# Patient Record
Sex: MALE | Race: WHITE | Employment: FULL TIME | URBAN - NONMETROPOLITAN AREA
[De-identification: names, ages, dates, MRNs, and addresses within clinical notes are randomized per-mention and may not be internally consistent; named-entity substitution may affect disease eponyms.]

---

## 2017-01-06 ENCOUNTER — OFFICE VISIT (OUTPATIENT)
Dept: PRIMARY CARE CLINIC | Age: 27
End: 2017-01-06

## 2017-01-06 VITALS
DIASTOLIC BLOOD PRESSURE: 64 MMHG | BODY MASS INDEX: 29.39 KG/M2 | HEART RATE: 112 BPM | TEMPERATURE: 102.1 F | WEIGHT: 217 LBS | HEIGHT: 72 IN | SYSTOLIC BLOOD PRESSURE: 100 MMHG | OXYGEN SATURATION: 100 %

## 2017-01-06 DIAGNOSIS — R50.9 FEVER, UNSPECIFIED FEVER CAUSE: Primary | ICD-10-CM

## 2017-01-06 DIAGNOSIS — J10.1 INFLUENZA A: ICD-10-CM

## 2017-01-06 LAB
INFLUENZA A ANTIBODY: POSITIVE
INFLUENZA B ANTIBODY: NEGATIVE

## 2017-01-06 PROCEDURE — 99213 OFFICE O/P EST LOW 20 MIN: CPT | Performed by: FAMILY MEDICINE

## 2017-01-06 PROCEDURE — 87804 INFLUENZA ASSAY W/OPTIC: CPT | Performed by: FAMILY MEDICINE

## 2017-01-06 RX ORDER — OSELTAMIVIR PHOSPHATE 75 MG/1
75 CAPSULE ORAL 2 TIMES DAILY
Qty: 10 CAPSULE | Refills: 0 | Status: SHIPPED | OUTPATIENT
Start: 2017-01-06 | End: 2017-01-11

## 2017-01-06 ASSESSMENT — ENCOUNTER SYMPTOMS
GASTROINTESTINAL NEGATIVE: 1
COUGH: 1
EYES NEGATIVE: 1
SORE THROAT: 1
ALLERGIC/IMMUNOLOGIC NEGATIVE: 1
RHINORRHEA: 1

## 2018-01-14 ENCOUNTER — OFFICE VISIT (OUTPATIENT)
Dept: PRIMARY CARE CLINIC | Age: 28
End: 2018-01-14

## 2018-01-14 VITALS
WEIGHT: 223 LBS | HEART RATE: 95 BPM | DIASTOLIC BLOOD PRESSURE: 82 MMHG | TEMPERATURE: 98.3 F | BODY MASS INDEX: 30.2 KG/M2 | HEIGHT: 72 IN | SYSTOLIC BLOOD PRESSURE: 124 MMHG

## 2018-01-14 DIAGNOSIS — J02.9 PHARYNGITIS, UNSPECIFIED ETIOLOGY: ICD-10-CM

## 2018-01-14 DIAGNOSIS — J02.9 SORE THROAT: ICD-10-CM

## 2018-01-14 DIAGNOSIS — H66.001 ACUTE SUPPURATIVE OTITIS MEDIA OF RIGHT EAR WITHOUT SPONTANEOUS RUPTURE OF TYMPANIC MEMBRANE, RECURRENCE NOT SPECIFIED: Primary | ICD-10-CM

## 2018-01-14 LAB — S PYO AG THROAT QL: NORMAL

## 2018-01-14 PROCEDURE — 87880 STREP A ASSAY W/OPTIC: CPT | Performed by: FAMILY MEDICINE

## 2018-01-14 PROCEDURE — 99214 OFFICE O/P EST MOD 30 MIN: CPT | Performed by: FAMILY MEDICINE

## 2018-01-14 RX ORDER — AZITHROMYCIN 250 MG/1
TABLET, FILM COATED ORAL
Qty: 1 PACKET | Refills: 1 | Status: SHIPPED | OUTPATIENT
Start: 2018-01-14 | End: 2019-01-26

## 2018-01-14 ASSESSMENT — ENCOUNTER SYMPTOMS
ABDOMINAL PAIN: 0
EYE REDNESS: 0
CONSTIPATION: 0
EYE DISCHARGE: 0
SINUS PAIN: 0
SORE THROAT: 1
NAUSEA: 0
WHEEZING: 0
SINUS PRESSURE: 0
VOMITING: 0
COUGH: 0
RHINORRHEA: 0
SHORTNESS OF BREATH: 0
TROUBLE SWALLOWING: 0
DIARRHEA: 0
SWOLLEN GLANDS: 1

## 2019-01-26 ENCOUNTER — OFFICE VISIT (OUTPATIENT)
Dept: PRIMARY CARE CLINIC | Age: 29
End: 2019-01-26
Payer: COMMERCIAL

## 2019-01-26 VITALS
TEMPERATURE: 98.7 F | OXYGEN SATURATION: 97 % | DIASTOLIC BLOOD PRESSURE: 84 MMHG | BODY MASS INDEX: 30.2 KG/M2 | SYSTOLIC BLOOD PRESSURE: 126 MMHG | WEIGHT: 223 LBS | HEIGHT: 72 IN | HEART RATE: 88 BPM

## 2019-01-26 DIAGNOSIS — J20.9 ACUTE BRONCHITIS, UNSPECIFIED ORGANISM: Primary | ICD-10-CM

## 2019-01-26 PROCEDURE — 99214 OFFICE O/P EST MOD 30 MIN: CPT | Performed by: FAMILY MEDICINE

## 2019-01-26 RX ORDER — PREDNISONE 20 MG/1
TABLET ORAL
Qty: 10 TABLET | Refills: 0 | Status: SHIPPED | OUTPATIENT
Start: 2019-01-26

## 2019-01-26 RX ORDER — BENZONATATE 100 MG/1
100 CAPSULE ORAL 3 TIMES DAILY PRN
Qty: 30 CAPSULE | Refills: 1 | Status: SHIPPED | OUTPATIENT
Start: 2019-01-26

## 2019-01-26 ASSESSMENT — ENCOUNTER SYMPTOMS
SORE THROAT: 1
SINUS PAIN: 1
SHORTNESS OF BREATH: 0
VOMITING: 0
NAUSEA: 0
TROUBLE SWALLOWING: 0
RHINORRHEA: 1
COUGH: 1
ABDOMINAL PAIN: 0
DIARRHEA: 0
CONSTIPATION: 0
SINUS PRESSURE: 0
EYE REDNESS: 0
EYE DISCHARGE: 0
WHEEZING: 0

## 2024-08-08 ENCOUNTER — HOSPITAL ENCOUNTER (OUTPATIENT)
Age: 34
Setting detail: SPECIMEN
Discharge: HOME | End: 2024-08-08
Payer: COMMERCIAL

## 2024-08-08 ENCOUNTER — OFFICE VISIT (OUTPATIENT)
Dept: PRIMARY CARE CLINIC | Age: 34
End: 2024-08-08
Payer: COMMERCIAL

## 2024-08-08 VITALS
OXYGEN SATURATION: 98 % | SYSTOLIC BLOOD PRESSURE: 100 MMHG | HEIGHT: 72 IN | RESPIRATION RATE: 22 BRPM | WEIGHT: 224 LBS | TEMPERATURE: 103.7 F | HEART RATE: 120 BPM | DIASTOLIC BLOOD PRESSURE: 70 MMHG | BODY MASS INDEX: 30.34 KG/M2

## 2024-08-08 DIAGNOSIS — R50.9 FEVER, UNSPECIFIED FEVER CAUSE: Primary | ICD-10-CM

## 2024-08-08 DIAGNOSIS — R50.9 FEVER, UNSPECIFIED FEVER CAUSE: ICD-10-CM

## 2024-08-08 DIAGNOSIS — Z78.9 RECENT FOREIGN TRAVEL: ICD-10-CM

## 2024-08-08 LAB
ALBUMIN SERPL-MCNC: 4.4 G/DL (ref 3.5–5.2)
ALBUMIN/GLOB SERPL: 1.5 {RATIO} (ref 1–2.5)
ALP SERPL-CCNC: 96 U/L (ref 40–129)
ALT SERPL-CCNC: 157 U/L (ref 5–41)
ANION GAP SERPL CALCULATED.3IONS-SCNC: 14 MMOL/L (ref 9–17)
AST SERPL-CCNC: 101 U/L
BASOPHILS # BLD: 0.04 K/UL (ref 0–0.2)
BASOPHILS NFR BLD: 1 % (ref 0–2)
BILIRUB SERPL-MCNC: 0.7 MG/DL (ref 0.3–1.2)
BILIRUB UR QL STRIP: NEGATIVE
BUN SERPL-MCNC: 15 MG/DL (ref 6–20)
BUN/CREAT SERPL: 12 (ref 9–20)
CALCIUM SERPL-MCNC: 8.9 MG/DL (ref 8.6–10.4)
CLARITY UR: CLEAR
CO2 SERPL-SCNC: 22 MMOL/L (ref 20–31)
COLOR UR: YELLOW
COMMENT: NORMAL
CREAT SERPL-MCNC: 1.3 MG/DL (ref 0.7–1.2)
EOSINOPHIL # BLD: <0.03 K/UL (ref 0–0.44)
EOSINOPHILS RELATIVE PERCENT: 0 % (ref 1–4)
ERYTHROCYTE [DISTWIDTH] IN BLOOD BY AUTOMATED COUNT: 12.5 % (ref 11.8–14.4)
GFR, ESTIMATED: 73 ML/MIN/1.73M2
GLUCOSE SERPL-MCNC: 107 MG/DL (ref 70–99)
GLUCOSE UR STRIP-MCNC: NEGATIVE MG/DL
HCT VFR BLD AUTO: 44.2 % (ref 40.7–50.3)
HGB BLD-MCNC: 15.6 G/DL (ref 13–17)
HGB UR QL STRIP.AUTO: NEGATIVE
IMM GRANULOCYTES # BLD AUTO: <0.03 K/UL (ref 0–0.3)
IMM GRANULOCYTES NFR BLD: 0 %
KETONES UR STRIP-MCNC: NEGATIVE MG/DL
LEUKOCYTE ESTERASE UR QL STRIP: NEGATIVE
LYMPHOCYTES NFR BLD: 0.83 K/UL (ref 1.1–3.7)
LYMPHOCYTES RELATIVE PERCENT: 18 % (ref 24–43)
Lab: NORMAL
MCH RBC QN AUTO: 29.6 PG (ref 25.2–33.5)
MCHC RBC AUTO-ENTMCNC: 35.3 G/DL (ref 25.2–33.5)
MCV RBC AUTO: 83.9 FL (ref 82.6–102.9)
MONOCYTES NFR BLD: 0.47 K/UL (ref 0.1–1.2)
MONOCYTES NFR BLD: 10 % (ref 3–12)
NEUTROPHILS NFR BLD: 71 % (ref 36–65)
NEUTS SEG NFR BLD: 3.39 K/UL (ref 1.5–8.1)
NITRITE UR QL STRIP: NEGATIVE
NRBC BLD-RTO: 0 PER 100 WBC
PH UR STRIP: 6 [PH] (ref 5–6)
PLATELET # BLD AUTO: 158 K/UL (ref 138–453)
PMV BLD AUTO: 9.1 FL (ref 8.1–13.5)
POTASSIUM SERPL-SCNC: 3.9 MMOL/L (ref 3.7–5.3)
PROT SERPL-MCNC: 7.4 G/DL (ref 6.4–8.3)
PROT UR STRIP-MCNC: NEGATIVE MG/DL
QC PASS/FAIL: NORMAL
RBC # BLD AUTO: 5.27 M/UL (ref 4.21–5.77)
SARS-COV-2 RDRP RESP QL NAA+PROBE: NEGATIVE
SODIUM SERPL-SCNC: 136 MMOL/L (ref 135–144)
SP GR UR STRIP: 1.02 (ref 1.01–1.02)
UROBILINOGEN UR STRIP-ACNC: NORMAL EU/DL (ref 0–1)
WBC OTHER # BLD: 4.7 K/UL (ref 3.5–11.3)

## 2024-08-08 PROCEDURE — 85025 COMPLETE CBC W/AUTO DIFF WBC: CPT

## 2024-08-08 PROCEDURE — 87635 SARS-COV-2 COVID-19 AMP PRB: CPT | Performed by: FAMILY MEDICINE

## 2024-08-08 PROCEDURE — 1036F TOBACCO NON-USER: CPT | Performed by: FAMILY MEDICINE

## 2024-08-08 PROCEDURE — 87207 SMEAR SPECIAL STAIN: CPT

## 2024-08-08 PROCEDURE — 80053 COMPREHEN METABOLIC PANEL: CPT

## 2024-08-08 PROCEDURE — 81003 URINALYSIS AUTO W/O SCOPE: CPT

## 2024-08-08 PROCEDURE — 99204 OFFICE O/P NEW MOD 45 MIN: CPT | Performed by: FAMILY MEDICINE

## 2024-08-08 PROCEDURE — G8417 CALC BMI ABV UP PARAM F/U: HCPCS | Performed by: FAMILY MEDICINE

## 2024-08-08 PROCEDURE — G8427 DOCREV CUR MEDS BY ELIG CLIN: HCPCS | Performed by: FAMILY MEDICINE

## 2024-08-08 PROCEDURE — 36415 COLL VENOUS BLD VENIPUNCTURE: CPT

## 2024-08-08 PROCEDURE — 86790 VIRUS ANTIBODY NOS: CPT

## 2024-08-08 RX ORDER — BUDESONIDE AND FORMOTEROL FUMARATE DIHYDRATE 160; 4.5 UG/1; UG/1
2 AEROSOL RESPIRATORY (INHALATION) DAILY
Status: ON HOLD | COMMUNITY
Start: 2024-04-25

## 2024-08-08 RX ORDER — MONTELUKAST SODIUM 10 MG/1
10 TABLET ORAL NIGHTLY
Status: ON HOLD | COMMUNITY
Start: 2024-04-25

## 2024-08-08 SDOH — ECONOMIC STABILITY: INCOME INSECURITY: HOW HARD IS IT FOR YOU TO PAY FOR THE VERY BASICS LIKE FOOD, HOUSING, MEDICAL CARE, AND HEATING?: NOT HARD AT ALL

## 2024-08-08 SDOH — ECONOMIC STABILITY: FOOD INSECURITY: WITHIN THE PAST 12 MONTHS, YOU WORRIED THAT YOUR FOOD WOULD RUN OUT BEFORE YOU GOT MONEY TO BUY MORE.: NEVER TRUE

## 2024-08-08 SDOH — ECONOMIC STABILITY: HOUSING INSECURITY
IN THE LAST 12 MONTHS, WAS THERE A TIME WHEN YOU DID NOT HAVE A STEADY PLACE TO SLEEP OR SLEPT IN A SHELTER (INCLUDING NOW)?: NO

## 2024-08-08 SDOH — ECONOMIC STABILITY: FOOD INSECURITY: WITHIN THE PAST 12 MONTHS, THE FOOD YOU BOUGHT JUST DIDN'T LAST AND YOU DIDN'T HAVE MONEY TO GET MORE.: NEVER TRUE

## 2024-08-08 ASSESSMENT — ENCOUNTER SYMPTOMS
CHEST TIGHTNESS: 0
SORE THROAT: 0
DIARRHEA: 1
NAUSEA: 0
VOMITING: 0
SHORTNESS OF BREATH: 0
ABDOMINAL PAIN: 0
BLOOD IN STOOL: 0
COUGH: 1
WHEEZING: 0

## 2024-08-08 ASSESSMENT — PATIENT HEALTH QUESTIONNAIRE - PHQ9
SUM OF ALL RESPONSES TO PHQ QUESTIONS 1-9: 0
SUM OF ALL RESPONSES TO PHQ QUESTIONS 1-9: 0
SUM OF ALL RESPONSES TO PHQ9 QUESTIONS 1 & 2: 0
SUM OF ALL RESPONSES TO PHQ QUESTIONS 1-9: 0
SUM OF ALL RESPONSES TO PHQ QUESTIONS 1-9: 0
1. LITTLE INTEREST OR PLEASURE IN DOING THINGS: NOT AT ALL
2. FEELING DOWN, DEPRESSED OR HOPELESS: NOT AT ALL

## 2024-08-08 NOTE — PROGRESS NOTES
Total Protein 08/08/2024 7.4  6.4 - 8.3 g/dL Final    Albumin 08/08/2024 4.4  3.5 - 5.2 g/dL Final    Albumin/Globulin Ratio 08/08/2024 1.5  1.0 - 2.5 Final    Total Bilirubin 08/08/2024 0.7  0.3 - 1.2 mg/dL Final    Alkaline Phosphatase 08/08/2024 96  40 - 129 U/L Final    ALT 08/08/2024 157 (H)  5 - 41 U/L Final    AST 08/08/2024 101 (H)  <40 U/L Final   Office Visit on 08/08/2024   Component Date Value Ref Range Status    SARS-COV-2, RdRp gene 08/08/2024 Negative  Negative Final    Lot Number 08/08/2024 [id number]   Final    QC Pass/Fail 08/08/2024 pass   Final            Plan:    Assessment & Plan     Fever with recent travel to [place]: new; his covid testing was negative so I tested for malaria and dengue fever. His LFTs are elevated so I advised him to avoid alcohol and to stay well hydrated. He will be called with malaria and dengue testing when it is available.     Return if symptoms worsen or fail to improve.    Orders Placed This Encounter   Procedures    Malaria Smear     Standing Status:   Future     Number of Occurrences:   1     Standing Expiration Date:   8/8/2025    Dengue Fever Antibodies IgG & IgM     Standing Status:   Future     Number of Occurrences:   1     Standing Expiration Date:   8/8/2025    Comprehensive Metabolic Panel     Standing Status:   Future     Number of Occurrences:   1     Standing Expiration Date:   8/8/2025    CBC with Auto Differential     Standing Status:   Future     Number of Occurrences:   1     Standing Expiration Date:   8/8/2025    Urinalysis with Reflex to Culture     Standing Status:   Future     Number of Occurrences:   1     Standing Expiration Date:   8/8/2025     Order Specific Question:   SPECIFY(EX-CATH,MIDSTREAM,CYSTO,ETC)?     Answer:   midstream    POCT COVID-19 Rapid, NAAT     Order Specific Question:   Pregnant:     Answer:   No         Patientgiven educational materials - see patient instructions.  Discussed use, benefit,and side effects of prescribed

## 2024-08-10 ENCOUNTER — APPOINTMENT (OUTPATIENT)
Dept: CT IMAGING | Age: 34
End: 2024-08-10
Payer: COMMERCIAL

## 2024-08-10 ENCOUNTER — HOSPITAL ENCOUNTER (EMERGENCY)
Age: 34
Discharge: ANOTHER HOSPITAL | End: 2024-08-10
Attending: EMERGENCY MEDICINE
Payer: COMMERCIAL

## 2024-08-10 ENCOUNTER — HOSPITAL ENCOUNTER (INPATIENT)
Age: 34
LOS: 2 days | Discharge: HOME | DRG: 864 | End: 2024-08-12
Attending: STUDENT IN AN ORGANIZED HEALTH CARE EDUCATION/TRAINING PROGRAM | Admitting: STUDENT IN AN ORGANIZED HEALTH CARE EDUCATION/TRAINING PROGRAM
Payer: COMMERCIAL

## 2024-08-10 ENCOUNTER — TELEPHONE (OUTPATIENT)
Dept: PRIMARY CARE CLINIC | Age: 34
End: 2024-08-10

## 2024-08-10 VITALS
DIASTOLIC BLOOD PRESSURE: 71 MMHG | HEIGHT: 72 IN | HEART RATE: 92 BPM | OXYGEN SATURATION: 99 % | RESPIRATION RATE: 29 BRPM | TEMPERATURE: 100.2 F | SYSTOLIC BLOOD PRESSURE: 101 MMHG | WEIGHT: 223 LBS | BODY MASS INDEX: 30.2 KG/M2

## 2024-08-10 DIAGNOSIS — I21.4 NSTEMI (NON-ST ELEVATED MYOCARDIAL INFARCTION) (HCC): Primary | ICD-10-CM

## 2024-08-10 DIAGNOSIS — R50.9 FEVER OF UNKNOWN ORIGIN: ICD-10-CM

## 2024-08-10 DIAGNOSIS — R74.8 ELEVATED LIVER ENZYMES: ICD-10-CM

## 2024-08-10 DIAGNOSIS — L60.0 INGROWING NAIL: ICD-10-CM

## 2024-08-10 DIAGNOSIS — R55 SYNCOPE AND COLLAPSE: ICD-10-CM

## 2024-08-10 DIAGNOSIS — R79.89 ELEVATED TROPONIN: Primary | ICD-10-CM

## 2024-08-10 DIAGNOSIS — A68.1: ICD-10-CM

## 2024-08-10 LAB
ALBUMIN SERPL-MCNC: 4.2 G/DL (ref 3.5–5.2)
ALBUMIN/GLOB SERPL: 1.4 {RATIO} (ref 1–2.5)
ALP SERPL-CCNC: 93 U/L (ref 40–129)
ALT SERPL-CCNC: 124 U/L (ref 5–41)
ANION GAP SERPL CALCULATED.3IONS-SCNC: 12 MMOL/L (ref 9–17)
AST SERPL-CCNC: 62 U/L
B PARAP IS1001 DNA NPH QL NAA+NON-PROBE: NOT DETECTED
B PERT DNA SPEC QL NAA+PROBE: NOT DETECTED
BACTERIA URNS QL MICRO: NORMAL
BASOPHILS # BLD: 0.03 K/UL (ref 0–0.2)
BASOPHILS NFR BLD: 1 % (ref 0–2)
BILIRUB DIRECT SERPL-MCNC: 0.2 MG/DL
BILIRUB INDIRECT SERPL-MCNC: 0.6 MG/DL (ref 0–1)
BILIRUB SERPL-MCNC: 0.8 MG/DL (ref 0.3–1.2)
BILIRUB UR QL STRIP: NEGATIVE
BUN SERPL-MCNC: 15 MG/DL (ref 6–20)
C PNEUM DNA NPH QL NAA+NON-PROBE: NOT DETECTED
CALCIUM SERPL-MCNC: 9 MG/DL (ref 8.6–10.4)
CASTS #/AREA URNS LPF: NORMAL /LPF (ref 0–8)
CHLORIDE SERPL-SCNC: 100 MMOL/L (ref 98–107)
CHOLEST SERPL-MCNC: 136 MG/DL (ref 0–199)
CHOLESTEROL/HDL RATIO: 9
CLARITY UR: CLEAR
CO2 SERPL-SCNC: 25 MMOL/L (ref 20–31)
COLOR UR: YELLOW
CREAT SERPL-MCNC: 1.1 MG/DL (ref 0.7–1.2)
CRP SERPL HS-MCNC: 55.8 MG/L (ref 0–5)
D DIMER PPP FEU-MCNC: 1.74 UG/ML FEU (ref 0–0.59)
EOSINOPHIL # BLD: <0.03 K/UL (ref 0–0.44)
EOSINOPHILS RELATIVE PERCENT: 0 % (ref 1–4)
EPI CELLS #/AREA URNS HPF: NORMAL /HPF (ref 0–5)
ERYTHROCYTE [DISTWIDTH] IN BLOOD BY AUTOMATED COUNT: 12.7 % (ref 11.8–14.4)
FLUAV RNA NPH QL NAA+NON-PROBE: NOT DETECTED
FLUBV RNA NPH QL NAA+NON-PROBE: NOT DETECTED
GFR, ESTIMATED: 90 ML/MIN/1.73M2
GLUCOSE SERPL-MCNC: 118 MG/DL (ref 70–99)
GLUCOSE UR STRIP-MCNC: NEGATIVE MG/DL
HADV DNA NPH QL NAA+NON-PROBE: NOT DETECTED
HAV IGM SERPL QL IA: NONREACTIVE
HBV CORE IGM SERPL QL IA: NONREACTIVE
HBV SURFACE AG SERPL QL IA: NONREACTIVE
HCOV 229E RNA NPH QL NAA+NON-PROBE: NOT DETECTED
HCOV HKU1 RNA NPH QL NAA+NON-PROBE: NOT DETECTED
HCOV NL63 RNA NPH QL NAA+NON-PROBE: NOT DETECTED
HCOV OC43 RNA NPH QL NAA+NON-PROBE: NOT DETECTED
HCT VFR BLD AUTO: 44.1 % (ref 40.7–50.3)
HCV AB SERPL QL IA: NONREACTIVE
HDLC SERPL-MCNC: 15 MG/DL
HGB BLD-MCNC: 15.6 G/DL (ref 13–17)
HGB UR QL STRIP.AUTO: NEGATIVE
HMPV RNA NPH QL NAA+NON-PROBE: NOT DETECTED
HPIV1 RNA NPH QL NAA+NON-PROBE: NOT DETECTED
HPIV2 RNA NPH QL NAA+NON-PROBE: NOT DETECTED
HPIV3 RNA NPH QL NAA+NON-PROBE: NOT DETECTED
HPIV4 RNA NPH QL NAA+NON-PROBE: NOT DETECTED
IMM GRANULOCYTES # BLD AUTO: <0.03 K/UL (ref 0–0.3)
IMM GRANULOCYTES NFR BLD: 0 %
KETONES UR STRIP-MCNC: NEGATIVE MG/DL
LACTATE BLDV-SCNC: 1.7 MMOL/L (ref 0.5–1.9)
LDLC SERPL CALC-MCNC: ABNORMAL MG/DL (ref 0–100)
LDLC SERPL DIRECT ASSAY-MCNC: 65 MG/DL
LEUKOCYTE ESTERASE UR QL STRIP: NEGATIVE
LIPASE SERPL-CCNC: 48 U/L (ref 13–60)
LYMPHOCYTES NFR BLD: 1.01 K/UL (ref 1.1–3.7)
LYMPHOCYTES RELATIVE PERCENT: 16 % (ref 24–43)
M PNEUMO DNA NPH QL NAA+NON-PROBE: NOT DETECTED
MCH RBC QN AUTO: 29.8 PG (ref 25.2–33.5)
MCHC RBC AUTO-ENTMCNC: 35.4 G/DL (ref 25.2–33.5)
MCV RBC AUTO: 84.3 FL (ref 82.6–102.9)
MONOCYTES NFR BLD: 0.73 K/UL (ref 0.1–1.2)
MONOCYTES NFR BLD: 11 % (ref 3–12)
NEUTROPHILS NFR BLD: 72 % (ref 36–65)
NEUTS SEG NFR BLD: 4.73 K/UL (ref 1.5–8.1)
NITRITE UR QL STRIP: NEGATIVE
NRBC BLD-RTO: 0 PER 100 WBC
PH UR STRIP: 6.5 [PH] (ref 5–8)
PLATELET # BLD AUTO: 174 K/UL (ref 138–453)
PMV BLD AUTO: 9.8 FL (ref 8.1–13.5)
POTASSIUM SERPL-SCNC: 4.1 MMOL/L (ref 3.7–5.3)
PROCALCITONIN SERPL-MCNC: 0.39 NG/ML (ref 0–0.09)
PROT SERPL-MCNC: 7.3 G/DL (ref 6.4–8.3)
PROT UR STRIP-MCNC: NEGATIVE MG/DL
RBC # BLD AUTO: 5.23 M/UL (ref 4.21–5.77)
RBC #/AREA URNS HPF: NORMAL /HPF (ref 0–4)
RSV RNA NPH QL NAA+NON-PROBE: NOT DETECTED
RV+EV RNA NPH QL NAA+NON-PROBE: NOT DETECTED
SARS-COV-2 RNA NPH QL NAA+NON-PROBE: NOT DETECTED
SEND OUT REPORT: NORMAL
SODIUM SERPL-SCNC: 137 MMOL/L (ref 135–144)
SP GR UR STRIP: 1.03 (ref 1–1.03)
SPECIMEN DESCRIPTION: NORMAL
TEST NAME: NORMAL
TRIGL SERPL-MCNC: 683 MG/DL
TROPONIN I SERPL HS-MCNC: 102 NG/L (ref 0–22)
TROPONIN I SERPL HS-MCNC: 110 NG/L (ref 0–22)
UROBILINOGEN UR STRIP-ACNC: NORMAL EU/DL (ref 0–1)
VLDLC SERPL CALC-MCNC: ABNORMAL MG/DL
WBC #/AREA URNS HPF: NORMAL /HPF (ref 0–5)
WBC OTHER # BLD: 6.5 K/UL (ref 3.5–11.3)

## 2024-08-10 PROCEDURE — 87798 DETECT AGENT NOS DNA AMP: CPT

## 2024-08-10 PROCEDURE — 6360000002 HC RX W HCPCS: Performed by: INTERNAL MEDICINE

## 2024-08-10 PROCEDURE — 96375 TX/PRO/DX INJ NEW DRUG ADDON: CPT

## 2024-08-10 PROCEDURE — 2580000003 HC RX 258: Performed by: EMERGENCY MEDICINE

## 2024-08-10 PROCEDURE — 99285 EMERGENCY DEPT VISIT HI MDM: CPT

## 2024-08-10 PROCEDURE — 36415 COLL VENOUS BLD VENIPUNCTURE: CPT

## 2024-08-10 PROCEDURE — 2500000003 HC RX 250 WO HCPCS: Performed by: PHYSICIAN ASSISTANT

## 2024-08-10 PROCEDURE — G0378 HOSPITAL OBSERVATION PER HR: HCPCS

## 2024-08-10 PROCEDURE — 93005 ELECTROCARDIOGRAM TRACING: CPT | Performed by: EMERGENCY MEDICINE

## 2024-08-10 PROCEDURE — 80053 COMPREHEN METABOLIC PANEL: CPT

## 2024-08-10 PROCEDURE — 87154 CUL TYP ID BLD PTHGN 6+ TRGT: CPT

## 2024-08-10 PROCEDURE — 86140 C-REACTIVE PROTEIN: CPT

## 2024-08-10 PROCEDURE — 99222 1ST HOSP IP/OBS MODERATE 55: CPT | Performed by: PHYSICIAN ASSISTANT

## 2024-08-10 PROCEDURE — 87040 BLOOD CULTURE FOR BACTERIA: CPT

## 2024-08-10 PROCEDURE — 96366 THER/PROPH/DIAG IV INF ADDON: CPT

## 2024-08-10 PROCEDURE — G0379 DIRECT REFER HOSPITAL OBSERV: HCPCS

## 2024-08-10 PROCEDURE — 0202U NFCT DS 22 TRGT SARS-COV-2: CPT

## 2024-08-10 PROCEDURE — 82248 BILIRUBIN DIRECT: CPT

## 2024-08-10 PROCEDURE — 99254 IP/OBS CNSLTJ NEW/EST MOD 60: CPT | Performed by: INTERNAL MEDICINE

## 2024-08-10 PROCEDURE — 2580000003 HC RX 258: Performed by: PHYSICIAN ASSISTANT

## 2024-08-10 PROCEDURE — 6360000004 HC RX CONTRAST MEDICATION: Performed by: EMERGENCY MEDICINE

## 2024-08-10 PROCEDURE — 96372 THER/PROPH/DIAG INJ SC/IM: CPT

## 2024-08-10 PROCEDURE — 84484 ASSAY OF TROPONIN QUANT: CPT

## 2024-08-10 PROCEDURE — 83605 ASSAY OF LACTIC ACID: CPT

## 2024-08-10 PROCEDURE — 84145 PROCALCITONIN (PCT): CPT

## 2024-08-10 PROCEDURE — 1200000000 HC SEMI PRIVATE

## 2024-08-10 PROCEDURE — 96365 THER/PROPH/DIAG IV INF INIT: CPT

## 2024-08-10 PROCEDURE — 83690 ASSAY OF LIPASE: CPT

## 2024-08-10 PROCEDURE — 6360000002 HC RX W HCPCS: Performed by: EMERGENCY MEDICINE

## 2024-08-10 PROCEDURE — 81001 URINALYSIS AUTO W/SCOPE: CPT

## 2024-08-10 PROCEDURE — 2500000003 HC RX 250 WO HCPCS: Performed by: EMERGENCY MEDICINE

## 2024-08-10 PROCEDURE — 85025 COMPLETE CBC W/AUTO DIFF WBC: CPT

## 2024-08-10 PROCEDURE — 87205 SMEAR GRAM STAIN: CPT

## 2024-08-10 PROCEDURE — 85379 FIBRIN DEGRADATION QUANT: CPT

## 2024-08-10 PROCEDURE — 71260 CT THORAX DX C+: CPT

## 2024-08-10 PROCEDURE — 83721 ASSAY OF BLOOD LIPOPROTEIN: CPT

## 2024-08-10 PROCEDURE — 80061 LIPID PANEL: CPT

## 2024-08-10 PROCEDURE — 80074 ACUTE HEPATITIS PANEL: CPT

## 2024-08-10 RX ORDER — ENOXAPARIN SODIUM 100 MG/ML
40 INJECTION SUBCUTANEOUS DAILY
Status: DISCONTINUED | OUTPATIENT
Start: 2024-08-11 | End: 2024-08-12 | Stop reason: HOSPADM

## 2024-08-10 RX ORDER — MAGNESIUM SULFATE IN WATER 40 MG/ML
2000 INJECTION, SOLUTION INTRAVENOUS PRN
Status: DISCONTINUED | OUTPATIENT
Start: 2024-08-10 | End: 2024-08-12 | Stop reason: HOSPADM

## 2024-08-10 RX ORDER — POLYETHYLENE GLYCOL 3350 17 G/17G
17 POWDER, FOR SOLUTION ORAL DAILY PRN
Status: DISCONTINUED | OUTPATIENT
Start: 2024-08-10 | End: 2024-08-12 | Stop reason: HOSPADM

## 2024-08-10 RX ORDER — ENOXAPARIN SODIUM 100 MG/ML
1 INJECTION SUBCUTANEOUS ONCE
Status: COMPLETED | OUTPATIENT
Start: 2024-08-10 | End: 2024-08-10

## 2024-08-10 RX ORDER — 0.9 % SODIUM CHLORIDE 0.9 %
1000 INTRAVENOUS SOLUTION INTRAVENOUS ONCE
Status: COMPLETED | OUTPATIENT
Start: 2024-08-10 | End: 2024-08-10

## 2024-08-10 RX ORDER — SODIUM CHLORIDE 9 MG/ML
INJECTION, SOLUTION INTRAVENOUS CONTINUOUS
Status: DISCONTINUED | OUTPATIENT
Start: 2024-08-10 | End: 2024-08-12 | Stop reason: HOSPADM

## 2024-08-10 RX ORDER — POTASSIUM CHLORIDE 7.45 MG/ML
10 INJECTION INTRAVENOUS PRN
Status: DISCONTINUED | OUTPATIENT
Start: 2024-08-10 | End: 2024-08-12 | Stop reason: HOSPADM

## 2024-08-10 RX ORDER — ONDANSETRON 2 MG/ML
4 INJECTION INTRAMUSCULAR; INTRAVENOUS EVERY 6 HOURS PRN
Status: DISCONTINUED | OUTPATIENT
Start: 2024-08-10 | End: 2024-08-12 | Stop reason: HOSPADM

## 2024-08-10 RX ORDER — ONDANSETRON 4 MG/1
4 TABLET, ORALLY DISINTEGRATING ORAL EVERY 8 HOURS PRN
Status: DISCONTINUED | OUTPATIENT
Start: 2024-08-10 | End: 2024-08-12 | Stop reason: HOSPADM

## 2024-08-10 RX ORDER — POTASSIUM CHLORIDE 20 MEQ/1
40 TABLET, EXTENDED RELEASE ORAL PRN
Status: DISCONTINUED | OUTPATIENT
Start: 2024-08-10 | End: 2024-08-12 | Stop reason: HOSPADM

## 2024-08-10 RX ORDER — KETOROLAC TROMETHAMINE 30 MG/ML
30 INJECTION, SOLUTION INTRAMUSCULAR; INTRAVENOUS ONCE
Status: COMPLETED | OUTPATIENT
Start: 2024-08-10 | End: 2024-08-10

## 2024-08-10 RX ORDER — SODIUM CHLORIDE 0.9 % (FLUSH) 0.9 %
5-40 SYRINGE (ML) INJECTION EVERY 12 HOURS SCHEDULED
Status: DISCONTINUED | OUTPATIENT
Start: 2024-08-10 | End: 2024-08-12 | Stop reason: HOSPADM

## 2024-08-10 RX ORDER — ALBUTEROL SULFATE 0.63 MG/3ML
1 SOLUTION RESPIRATORY (INHALATION) EVERY 6 HOURS PRN
COMMUNITY

## 2024-08-10 RX ORDER — SODIUM CHLORIDE 0.9 % (FLUSH) 0.9 %
5-40 SYRINGE (ML) INJECTION PRN
Status: DISCONTINUED | OUTPATIENT
Start: 2024-08-10 | End: 2024-08-12 | Stop reason: HOSPADM

## 2024-08-10 RX ORDER — SODIUM CHLORIDE 9 MG/ML
INJECTION, SOLUTION INTRAVENOUS PRN
Status: DISCONTINUED | OUTPATIENT
Start: 2024-08-10 | End: 2024-08-12 | Stop reason: HOSPADM

## 2024-08-10 RX ORDER — IBUPROFEN 400 MG/1
400 TABLET ORAL EVERY 6 HOURS PRN
Status: DISCONTINUED | OUTPATIENT
Start: 2024-08-10 | End: 2024-08-11

## 2024-08-10 RX ADMIN — Medication 2000 MG: at 22:25

## 2024-08-10 RX ADMIN — ENOXAPARIN SODIUM 100 MG: 100 INJECTION SUBCUTANEOUS at 13:30

## 2024-08-10 RX ADMIN — SODIUM CHLORIDE: 9 INJECTION, SOLUTION INTRAVENOUS at 18:42

## 2024-08-10 RX ADMIN — KETOROLAC TROMETHAMINE 30 MG: 30 INJECTION, SOLUTION INTRAMUSCULAR at 13:29

## 2024-08-10 RX ADMIN — IOPAMIDOL 80 ML: 755 INJECTION, SOLUTION INTRAVENOUS at 11:29

## 2024-08-10 RX ADMIN — SODIUM CHLORIDE 1000 ML: 9 INJECTION, SOLUTION INTRAVENOUS at 10:40

## 2024-08-10 RX ADMIN — DOXYCYCLINE 100 MG: 100 INJECTION, POWDER, LYOPHILIZED, FOR SOLUTION INTRAVENOUS at 10:52

## 2024-08-10 RX ADMIN — DOXYCYCLINE 100 MG: 100 INJECTION, POWDER, LYOPHILIZED, FOR SOLUTION INTRAVENOUS at 22:44

## 2024-08-10 ASSESSMENT — ENCOUNTER SYMPTOMS
ABDOMINAL PAIN: 1
SHORTNESS OF BREATH: 0
BACK PAIN: 1
BLOOD IN STOOL: 0
VOMITING: 1
TROUBLE SWALLOWING: 0
CONSTIPATION: 0
DIARRHEA: 0
NAUSEA: 1
WHEEZING: 0
SORE THROAT: 0

## 2024-08-10 ASSESSMENT — PAIN - FUNCTIONAL ASSESSMENT
PAIN_FUNCTIONAL_ASSESSMENT: 0-10
PAIN_FUNCTIONAL_ASSESSMENT: NONE - DENIES PAIN
PAIN_FUNCTIONAL_ASSESSMENT: NONE - DENIES PAIN

## 2024-08-10 ASSESSMENT — LIFESTYLE VARIABLES
HOW OFTEN DO YOU HAVE A DRINK CONTAINING ALCOHOL: MONTHLY OR LESS
HOW OFTEN DO YOU HAVE A DRINK CONTAINING ALCOHOL: MONTHLY OR LESS
HOW MANY STANDARD DRINKS CONTAINING ALCOHOL DO YOU HAVE ON A TYPICAL DAY: PATIENT DOES NOT DRINK

## 2024-08-10 ASSESSMENT — PAIN SCALES - GENERAL
PAINLEVEL_OUTOF10: 0
PAINLEVEL_OUTOF10: 7

## 2024-08-10 ASSESSMENT — PAIN DESCRIPTION - LOCATION
LOCATION: HEAD
LOCATION: HEAD

## 2024-08-10 ASSESSMENT — PAIN DESCRIPTION - DESCRIPTORS: DESCRIPTORS: ACHING

## 2024-08-10 NOTE — TELEPHONE ENCOUNTER
Wife called in this morning, she reported Jose passing out and blacking out after getting out of the shower this morning. I instructed her to take him into the ER. We are still awaiting test results back from Malaria and Dengue Fever.

## 2024-08-10 NOTE — CONSULTS
[facility] -   Infectious diseases evaluation  admission date 8/10/2024    reason for consultation:   Viral illness in [place]    Impression :   Current:  Febrile illness with headache, papular rash, muscle aches, elevated troponin, lymphopenia, relative neutropenia, thrombocytopenia, elevated liver enzymes  Recent trip to [place] hunting and screening in [place]-  Exposed to ticks and to mosquito, to rodents  Right fifth toe bite, improving     Other:  Rash to penicillin as a young child  Discussion / summary of stay / plan of care/ Recommendations:   Differential, chikungunya, Leptospira, Ehrlichia, Rickettsia, hantavirus, dengue, malaria, typhoid  HENCE:   Pending dengue titers from 8/8  Sending malaria smear 8/10 and 8/11  Pending malaria smear 8/8  Send titers for ehrlichia, leptospira blood and urine, hepatitis panel, typhoid, chikungunya,   2 blood cultures for 24 to be repeated  Respiratory PCR panel to be done   Will need to follow on CRP, liver function test CBC troponin daily    Antimicrobials:  Doxycycline 100 twice a day started 8/10  Ceftriaxone 2 g a day started 8/10  Long discussion with family  Case discussed with Dr. See  Infection Control Recommendations   [place] Precautions  Contact Isolation       Antimicrobial Stewardship Recommendations   Simplification of therapy  Targeted therapy      History of Present Illness:   Initial history:  Jose Ruby is a 34 y.o.-year-old male who went to [place] for 8 days hunting swimming and [place], went into bed everywhere, exposed to ticks to mosquito but also to rodents    He went with a relative, both of them were sick within the week of coming back  Patient came back  8/1, started noticing a few days later aches all over possibly a low-grade fever but then  on 8/8 a fever that was high  Went to a [facility] urgent care they sent blood for a thick smear looking for malaria as well as a  dengue virus, they tested him negative for sore except throat they also tested negative for a rapid COVID.    On 8/10 the patient vomited at home he was not feeling good and he fainted.  This is when he came to the emergency room to [place] and was sent to [place]    He is alert appropriate, he has a fine rash that seems to be papular, spreading diffusely over the arms and the back, seems to be more affecting the arms, not affecting the wrist areas or the palms or the soles  No blistering or pustules and on the top of them    He has a headache, does not have a sore throat.  He has no joint pain but she has fatigue and aches all over.    The fever that he is having is not rhythmic seems to be just present  No major chills associated    He has a right fifth toe insect bite that seems to be resolving without any black eschar    His white count is relatively on the low side, with a lymphopenia as well as elevated troponin 102, platelets 174 decreased, elevated liver enzymes that seem to range in the low 100s  His CRP was 55    Infectious consulted for antibiotic management and for diagnosis      Interval changes  8/10/2024   Patient Vitals for the past 8 hrs:   BP Temp Temp src Pulse Resp SpO2 Height Weight   08/10/24 1815 -- -- -- -- -- -- 1.829 m (6') 101.6 kg (224 lb)   08/10/24 1814 113/71 -- -- -- -- -- -- --   08/10/24 1800 113/71 98.3 °F (36.8 °C) Oral 89 13 100 % -- --       Summary of relevant labs:  Labs:  CRP 55  -157  AST 62-1 01  Alkaline phosphatase 93-96  Bilirubin 0.7  Glucose is 118    WBC 6.5-4.7  Neutrophils 72%  Lymphocyte 16-18%   absolute lymphocyte count 1-0.8  Platelets 174-158          Micro:  COVID rapid -8/8  Strep throat rapid -8/8    Malaria smear 8/8 pending-preliminary negative  Malaria smear 8/10  Malaria smear 8/11  Dengue antibodies 8/8 pending      Procedures      Cardiology      Imaging:  CT chest negative for PE and for pneumonia on 8/10    I have personally reviewed the

## 2024-08-10 NOTE — ED PROVIDER NOTES
[facility]  eMERGENCY dEPARTMENT eNCOUnter      Pt Name: Jose Ruby  MRN: [id number]  Birthdate 1990  Date of evaluation: 8/10/2024      CHIEF COMPLAINT       Chief Complaint   Patient presents with    Fatigue    Fever    Generalized Body Aches    Loss of Consciousness     During vomiting    Nausea    Emesis    Insect Bite     Rt 5th toe         HISTORY OF PRESENT ILLNESS    Jose Ruby is a 34 y.o. male who presents with chief complaint of syncope, patient has had a fever and fatigue since returning from [place] last Thursday he was seen in urgent care and had a workup done malaria and dengue swabs are still pending.  Patient said today he vomited multiple times in the shower and then got lightheaded and passed out that he did not injure himself he has generalized bodyaches including some back pain and actually saw his chiropractor this week.  Patient states that his uncle was bit by a tick while he was in [place] with him and had to be hospitalized for the last week in [place].  Patient said he does have a bug bite on his fifth toe of the right foot and now has broken out in a rash  Patient has had fevers and intermittent chills    REVIEW OF SYSTEMS         Review of Systems   Constitutional:  Positive for appetite change, chills, fatigue and fever.   HENT:  Negative for congestion, dental problem, sore throat and trouble swallowing.    Eyes:  Negative for visual disturbance.   Respiratory:  Negative for shortness of breath and wheezing.    Cardiovascular:  Negative for chest pain, palpitations and leg swelling.   Gastrointestinal:  Positive for abdominal pain, nausea and vomiting. Negative for blood in stool, constipation and diarrhea.   Genitourinary:  Negative for difficulty urinating, dysuria and testicular pain.   Musculoskeletal:  Positive for back pain. Negative for joint swelling and neck pain.   Skin:  Positive for rash.   Neurological:  Positive for syncope and  the following components:    Glucose 118 (*)      (*)     AST 62 (*)     All other components within normal limits   C-REACTIVE PROTEIN - Abnormal; Notable for the following components:    CRP 55.8 (*)     All other components within normal limits   CBC WITH AUTO DIFFERENTIAL - Abnormal; Notable for the following components:    MCHC 35.4 (*)     Neutrophils % 72 (*)     Lymphocytes % 16 (*)     Eosinophils % 0 (*)     Lymphocytes Absolute 1.01 (*)     All other components within normal limits   TROPONIN - Abnormal; Notable for the following components:    Troponin, High Sensitivity 102 (*)     All other components within normal limits   CULTURE, BLOOD 1   CULTURE, BLOOD 1   LACTATE, SEPSIS   LIPASE           EMERGENCY DEPARTMENT COURSE:   Vitals:    Vitals:    08/10/24 1253 08/10/24 1254 08/10/24 1309 08/10/24 1311   BP:       Pulse: (!) 105 100 (!) 104    Resp: 14 12 13 16   Temp:    100.2 °F (37.9 °C)   TempSrc:    Tympanic   SpO2: 100% 99% 98%    Weight:       Height:         -------------------------  BP: 124/79, Temp: 100.2 °F (37.9 °C), Pulse: (!) 104, Respirations: 16    Re-evaluation Notes    Resting comfortably tachycardia improved was orthostatic receiving fluids given Toradol for discomfort also given initial dose of doxycycline IV with his nausea and vomiting as well as Lovenox for the elevated troponin    CRITICAL CARE:           CONSULTS: Cardiology at [facility] was consulted and discussed the case with Dr. Cedrick Torres regarding his elevated troponins  Discussed with hospitalist nurse practitioner, Marlene, who accepts for Dr. Parikh    PROCEDURES:  None    FINAL IMPRESSION      1. NSTEMI (non-ST elevated myocardial infarction) (HCC)    2. Syncope and collapse    3. [place]  tick-bite fever          DISPOSITION/PLAN   DISPOSITION Decision To Transfer    Condition on Disposition    Stable    PATIENT REFERRED TO:  No follow-up provider specified.    DISCHARGE MEDICATIONS:  New Prescriptions

## 2024-08-10 NOTE — H&P
have fevers at home as high as 104 with myalgias and fatigue. He noticed a bug bite on his right fifth toe which he believes was from a tick. It started as a small bump and has since progressed to a rash. He went to urgent care 8/8/24 and had lab workup including malaria and dengue fever. These are still pending. He reports his uncle, who also went to [facility], was hospitalized last week for similar symptoms.     At [facility], patient was febrile 100.2.  Lab workup significant for elevated D-dimer, troponin 110 > 102, , AST 62, CRP 55.8.  Blood cultures drawn.  CT unremarkable. Patient was started on Doxycycline at [facility].  He was also given loading dose of Lovenox for elevated troponin.  [facility] spoke with cardiology who believes troponin elevation is not an NSTEMI.  Patient was transferred to [facility] for cardiology and ID consultation.    Past Medical History:     Past Medical History:   Diagnosis Date    Asthma         Past Surgical History:     No past surgical history on file.     Medications Prior to Admission:     Prior to Admission medications    Medication Sig Start Date End Date Taking? Authorizing Provider   albuterol (ACCUNEB) 0.63 MG/3ML nebulizer solution Take 3 mLs by nebulization every 6 hours as needed for Wheezing    ProviderVish MD   budesonide-formoterol (SYMBICORT) 160-4.5 MCG/ACT AERO Inhale 2 puffs into the lungs daily 4/25/24   ProviderVish MD   montelukast (SINGULAIR) 10 MG tablet Take 1 tablet by mouth nightly 4/25/24   ProviderVish MD   benzonatate (TESSALON PERLES) 100 MG capsule Take 1 capsule by mouth 3 times daily as needed for Cough  Patient not taking: Reported on 8/8/2024 1/26/19   Tanya Rodriguez DO        Allergies:     Pcn [penicillins] and Penicillin v    Social History:     Tobacco:    reports that he has never smoked. He has never used smokeless tobacco.  Alcohol:      reports no history of alcohol  Mental Status: He is alert.   Psychiatric:         Mood and Affect: Mood normal.         Behavior: Behavior normal.         Investigations:      Laboratory Testing:  Recent Results (from the past 24 hour(s))   Troponin    Collection Time: 08/10/24 10:26 AM   Result Value Ref Range    Troponin, High Sensitivity 110 (HH) 0 - 22 ng/L   D-Dimer, Quantitative    Collection Time: 08/10/24 10:26 AM   Result Value Ref Range    D-Dimer, Quant 1.74 (H) 0.00 - 0.59 ug/mL FEU   Lactate, Sepsis    Collection Time: 08/10/24 10:26 AM   Result Value Ref Range    Lactic Acid, Sepsis 1.7 0.5 - 1.9 mmol/L   Comprehensive Metabolic w/ Bili Profile w/ Reflex to MG    Collection Time: 08/10/24 10:26 AM   Result Value Ref Range    Sodium 137 135 - 144 mmol/L    Potassium 4.1 3.7 - 5.3 mmol/L    Chloride 100 98 - 107 mmol/L    CO2 25 20 - 31 mmol/L    Anion Gap 12 9 - 17 mmol/L    Glucose 118 (H) 70 - 99 mg/dL    BUN 15 6 - 20 mg/dL    Creatinine 1.1 0.7 - 1.2 mg/dL    Est, Glom Filt Rate 90 >60 mL/min/1.73m2    Calcium 9.0 8.6 - 10.4 mg/dL    Total Protein 7.3 6.4 - 8.3 g/dL    Albumin 4.2 3.5 - 5.2 g/dL    Albumin/Globulin Ratio 1.4 1.0 - 2.5    Total Bilirubin 0.8 0.3 - 1.2 mg/dL    Bilirubin, Direct 0.2 <0.3 mg/dL    Bilirubin, Indirect 0.6 0.0 - 1.0 mg/dL    Alkaline Phosphatase 93 40 - 129 U/L     (H) 5 - 41 U/L    AST 62 (H) <40 U/L   Lipase    Collection Time: 08/10/24 10:26 AM   Result Value Ref Range    Lipase 48 13 - 60 U/L   C-Reactive Protein    Collection Time: 08/10/24 10:26 AM   Result Value Ref Range    CRP 55.8 (H) 0.0 - 5.0 mg/L   CBC with Auto Differential    Collection Time: 08/10/24 10:26 AM   Result Value Ref Range    WBC 6.5 3.5 - 11.3 k/uL    RBC 5.23 4.21 - 5.77 m/uL    Hemoglobin 15.6 13.0 - 17.0 g/dL    Hematocrit 44.1 40.7 - 50.3 %    MCV 84.3 82.6 - 102.9 fL    MCH 29.8 25.2 - 33.5 pg    MCHC 35.4 (H) 25.2 - 33.5 g/dL    RDW 12.7 11.8 - 14.4 %    Platelets 174 138 - 453 k/uL    MPV 9.8 8.1 - 13.5 fL

## 2024-08-11 LAB
ALBUMIN SERPL-MCNC: 3.8 G/DL (ref 3.5–5.2)
ALBUMIN/GLOB SERPL: 1 {RATIO} (ref 1–2.5)
ALP SERPL-CCNC: 87 U/L (ref 40–129)
ALT SERPL-CCNC: 100 U/L (ref 10–50)
ANION GAP SERPL CALCULATED.3IONS-SCNC: 10 MMOL/L (ref 9–16)
AST SERPL-CCNC: 50 U/L (ref 10–50)
BASOPHILS # BLD: 0.05 K/UL (ref 0–0.2)
BASOPHILS NFR BLD: 1 % (ref 0–2)
BILIRUB SERPL-MCNC: 0.4 MG/DL (ref 0–1.2)
BUN SERPL-MCNC: 13 MG/DL (ref 6–20)
CALCIUM SERPL-MCNC: 8.5 MG/DL (ref 8.6–10.4)
CHLORIDE SERPL-SCNC: 107 MMOL/L (ref 98–107)
CO2 SERPL-SCNC: 19 MMOL/L (ref 20–31)
CREAT SERPL-MCNC: 1.1 MG/DL (ref 0.7–1.2)
CRP SERPL HS-MCNC: 51.3 MG/L (ref 0–5)
EKG ATRIAL RATE: 101 BPM
EKG ATRIAL RATE: 108 BPM
EKG P AXIS: 45 DEGREES
EKG P AXIS: 47 DEGREES
EKG P-R INTERVAL: 156 MS
EKG P-R INTERVAL: 160 MS
EKG Q-T INTERVAL: 316 MS
EKG Q-T INTERVAL: 340 MS
EKG QRS DURATION: 90 MS
EKG QRS DURATION: 94 MS
EKG QTC CALCULATION (BAZETT): 423 MS
EKG QTC CALCULATION (BAZETT): 440 MS
EKG R AXIS: 2 DEGREES
EKG R AXIS: 7 DEGREES
EKG T AXIS: 25 DEGREES
EKG T AXIS: 27 DEGREES
EKG VENTRICULAR RATE: 101 BPM
EKG VENTRICULAR RATE: 108 BPM
EOSINOPHIL # BLD: <0.03 K/UL (ref 0–0.44)
EOSINOPHILS RELATIVE PERCENT: 0 % (ref 1–4)
ERYTHROCYTE [DISTWIDTH] IN BLOOD BY AUTOMATED COUNT: 12.8 % (ref 11.8–14.4)
GFR, ESTIMATED: 90 ML/MIN/1.73M2
GLUCOSE SERPL-MCNC: 121 MG/DL (ref 74–99)
HCT VFR BLD AUTO: 41.5 % (ref 40.7–50.3)
HGB BLD-MCNC: 14 G/DL (ref 13–17)
IMM GRANULOCYTES # BLD AUTO: 0.03 K/UL (ref 0–0.3)
IMM GRANULOCYTES NFR BLD: 0 %
LYMPHOCYTES NFR BLD: 1.99 K/UL (ref 1.1–3.7)
LYMPHOCYTES RELATIVE PERCENT: 21 % (ref 24–43)
MCH RBC QN AUTO: 29.7 PG (ref 25.2–33.5)
MCHC RBC AUTO-ENTMCNC: 33.7 G/DL (ref 28.4–34.8)
MCV RBC AUTO: 88.1 FL (ref 82.6–102.9)
MICROORGANISM SPEC CULT: ABNORMAL
MICROORGANISM SPEC CULT: NORMAL
MONOCYTES NFR BLD: 1.26 K/UL (ref 0.1–1.2)
MONOCYTES NFR BLD: 13 % (ref 3–12)
NEUTROPHILS NFR BLD: 65 % (ref 36–65)
NEUTS SEG NFR BLD: 6.22 K/UL (ref 1.5–8.1)
NRBC BLD-RTO: 0 PER 100 WBC
PLATELET # BLD AUTO: 164 K/UL (ref 138–453)
PMV BLD AUTO: 9.5 FL (ref 8.1–13.5)
POTASSIUM SERPL-SCNC: 3.8 MMOL/L (ref 3.7–5.3)
PROT SERPL-MCNC: 6.4 G/DL (ref 6.6–8.7)
RBC # BLD AUTO: 4.71 M/UL (ref 4.21–5.77)
SERVICE CMNT-IMP: ABNORMAL
SERVICE CMNT-IMP: NORMAL
SODIUM SERPL-SCNC: 136 MMOL/L (ref 136–145)
SPECIMEN DESCRIPTION: ABNORMAL
SPECIMEN DESCRIPTION: NORMAL
TROPONIN I SERPL HS-MCNC: 66 NG/L (ref 0–22)
WBC OTHER # BLD: 9.6 K/UL (ref 3.5–11.3)

## 2024-08-11 PROCEDURE — 2500000003 HC RX 250 WO HCPCS: Performed by: PHYSICIAN ASSISTANT

## 2024-08-11 PROCEDURE — 85025 COMPLETE CBC W/AUTO DIFF WBC: CPT

## 2024-08-11 PROCEDURE — 2580000003 HC RX 258: Performed by: PHYSICIAN ASSISTANT

## 2024-08-11 PROCEDURE — 99232 SBSQ HOSP IP/OBS MODERATE 35: CPT | Performed by: STUDENT IN AN ORGANIZED HEALTH CARE EDUCATION/TRAINING PROGRAM

## 2024-08-11 PROCEDURE — 6370000000 HC RX 637 (ALT 250 FOR IP): Performed by: NURSE PRACTITIONER

## 2024-08-11 PROCEDURE — 84484 ASSAY OF TROPONIN QUANT: CPT

## 2024-08-11 PROCEDURE — 87207 SMEAR SPECIAL STAIN: CPT

## 2024-08-11 PROCEDURE — 1200000000 HC SEMI PRIVATE

## 2024-08-11 PROCEDURE — 99233 SBSQ HOSP IP/OBS HIGH 50: CPT | Performed by: INTERNAL MEDICINE

## 2024-08-11 PROCEDURE — 80053 COMPREHEN METABOLIC PANEL: CPT

## 2024-08-11 PROCEDURE — 6370000000 HC RX 637 (ALT 250 FOR IP): Performed by: PHYSICIAN ASSISTANT

## 2024-08-11 PROCEDURE — 99223 1ST HOSP IP/OBS HIGH 75: CPT | Performed by: INTERNAL MEDICINE

## 2024-08-11 PROCEDURE — 86140 C-REACTIVE PROTEIN: CPT

## 2024-08-11 PROCEDURE — 96366 THER/PROPH/DIAG IV INF ADDON: CPT

## 2024-08-11 PROCEDURE — 6360000002 HC RX W HCPCS: Performed by: INTERNAL MEDICINE

## 2024-08-11 PROCEDURE — 36415 COLL VENOUS BLD VENIPUNCTURE: CPT

## 2024-08-11 PROCEDURE — 96376 TX/PRO/DX INJ SAME DRUG ADON: CPT

## 2024-08-11 PROCEDURE — G0378 HOSPITAL OBSERVATION PER HR: HCPCS

## 2024-08-11 RX ORDER — DOXYCYCLINE HYCLATE 100 MG
100 TABLET ORAL 2 TIMES DAILY
Qty: 28 TABLET | Refills: 0 | Status: SHIPPED | OUTPATIENT
Start: 2024-08-11 | End: 2024-08-25

## 2024-08-11 RX ORDER — IBUPROFEN 400 MG/1
400 TABLET ORAL EVERY 6 HOURS PRN
Status: DISCONTINUED | OUTPATIENT
Start: 2024-08-11 | End: 2024-08-12 | Stop reason: HOSPADM

## 2024-08-11 RX ADMIN — DOXYCYCLINE 100 MG: 100 INJECTION, POWDER, LYOPHILIZED, FOR SOLUTION INTRAVENOUS at 11:16

## 2024-08-11 RX ADMIN — IBUPROFEN 400 MG: 400 TABLET, FILM COATED ORAL at 03:49

## 2024-08-11 RX ADMIN — DOXYCYCLINE 100 MG: 100 INJECTION, POWDER, LYOPHILIZED, FOR SOLUTION INTRAVENOUS at 23:18

## 2024-08-11 RX ADMIN — IBUPROFEN 400 MG: 400 TABLET, FILM COATED ORAL at 23:16

## 2024-08-11 RX ADMIN — Medication 2000 MG: at 20:07

## 2024-08-11 ASSESSMENT — PAIN DESCRIPTION - DESCRIPTORS
DESCRIPTORS: TIGHTNESS
DESCRIPTORS: TIGHTNESS

## 2024-08-11 ASSESSMENT — PAIN SCALES - GENERAL
PAINLEVEL_OUTOF10: 6
PAINLEVEL_OUTOF10: 8
PAINLEVEL_OUTOF10: 7
PAINLEVEL_OUTOF10: 5
PAINLEVEL_OUTOF10: 0

## 2024-08-11 ASSESSMENT — PAIN DESCRIPTION - LOCATION
LOCATION: NECK
LOCATION: BACK;NECK

## 2024-08-11 ASSESSMENT — PAIN DESCRIPTION - ORIENTATION
ORIENTATION: LEFT;RIGHT;LOWER
ORIENTATION: POSTERIOR

## 2024-08-11 NOTE — PROGRESS NOTES
[facility]  Office: [phone]  Micky Hoover DO, Neil Siddiqui DO, Francisco Hunter DO, Louie Rodriguez DO, Julita Calderon MD, Jennifer Chicas MD, Omar Banda MD, Lisette Zhao MD,  Lan Hanna MD, Morgan Mohamud MD, Lucia Horan MD,  Veronica Orlando DO, Benson Sharma MD, Juan R Gray MD, Yehuda Hoover DO, Angela Yun MD,  Reed Quiroz DO, Bre Pichardo MD, Sara Floyd MD, Gladys Gabriel MD, Hermilo Talley MD,  Rene Torres MD, Agnes Francisco MD, Amelia Kelly MD, María Guevara MD, Alfa Stephens MD, Davon Parikh MD, Per Jones DO, oCdy Rubio DO, Garo Braswell MD,  Dusty Aguiar MD, Shirley Waterhouse, CNP,  Shona Mckeon CNP, Angus Anton, CNP,  Klaudia Cartagena, DNP, Hilary Barrientos, CNP, Tanya Negrete, CNP, Patricia Mckeon CNP, Krystin Orantes, CNP, Katiuska Pelayo, PA-C, Marlene Whyte PA-C, Francisca Tijerina, CNP, Annette Neal, CNP, Julia Mcgraw, CNP, Rosy Carrillo, CNP, Rosana Monzon, CNS, Deepti Wren, CNP, Madhavi Chávez CNP, Tracy Schwab, CNP         [facility]   IN-PATIENT SERVICE   [facility]    Progress Note    8/11/2024    2:32 PM    Name:   Jose Ruby  MRN:     [id number]     Acct:      [id number]   Room:   0234/0234-01   Day:  1  Admit Date:  8/10/2024  6:12 PM    PCP:   No primary care provider on file.  Code Status:  Full Code    Subjective:     C/C: No chief complaint on file.    Interval History Status: improved.   Patient was seen and examined today, at time of my evaluation patient denies any complaint, he denies fever chills, edema denies chest pain denies nausea vomiting denies any bowel or urinary habit changes  Remain IV antibiotic pending final culture and lab result, ID and cardiology on board appreciate input      Review of Systems:     Review of Systems   Constitutional: Negative.    Eyes: Negative.    Respiratory: Negative.     Cardiovascular: Negative.    Gastrointestinal: Negative.   is no rebound.   Musculoskeletal:         General: No deformity.      Cervical back: No rigidity or tenderness.      Right lower leg: No edema.      Left lower leg: No edema.   Lymphadenopathy:      Cervical: No cervical adenopathy.   Skin:     Coloration: Skin is not jaundiced or pale.      Findings: No lesion or rash.   Neurological:      General: No focal deficit present.      Mental Status: He is alert and oriented to person, place, and time.      Sensory: No sensory deficit.      Motor: No weakness.   Psychiatric:         Mood and Affect: Mood normal.         Assessment:        Hospital Problems             Last Modified POA    * (Principal) Fever of unknown origin 8/10/2024 Yes    Elevated troponin 8/10/2024 Yes    Elevated liver enzymes 8/10/2024 Yes       Plan:        Fever of unknown origin present on admission, patient presented to hospital with headache febrile rash muscle aches, slightly elevated of liver enzyme after recent trip to [place], 1 set of his blood culture showed coag negative staph could be contaminant?  Repeat blood cultures so far negative, there is no leukocytosis, infectious disease is following pending thank titer malaria smear and titers for  ehrlichia, leptospira blood and urine, hepatitis panel, typhoid, chikungunya, appreciate infectious disease team on board will continue with IV antibiotic, symptomatic management  Elevated troponin most likely demand ischemia in setting of acute illness infection, patient remained chest pain-free cardiology on board recommended 2D echo to rule out myocarditis or any other wall motion for cardiac abnormality may contribute to symptoms, continue to monitor  Transaminitis present on admission-resolving  DVT prophylaxis  IV fluid  Correct electrolyte abnormalities  Pain management    María Guevara MD  8/11/2024  2:32 PM

## 2024-08-11 NOTE — CONSULTS
[facility]    Consult / H&P               Today's Date: 8/11/2024  Patient Name: Jose Ruby  Date of admission: 8/10/2024  6:12 PM  Patient's age: 34 y.o., 1990  Admission Dx: Fever of unknown origin [R50.9]    Requesting Physician: María Guevara MD    Cardiac Evaluation Reason:  elevated troponins    History Obtained From: patient/chart review     History of Present Illness:    This patient 34 y.o. years old male with past medical history as below.  Patient presented to hospital for evaluation of fever and loss of consciousness.  Patient has been swimming and hunting outside and reported exposure to ticks/rodents.  He was taking hot shower yesterday when he passed out.  He does state that he felt some lightheaded and dizziness before the syncope.  Cardiology has been consulted to evaluate for high-sensitivity troponins which were initially elevated to 100s and have down trended.  No chest pain or shortness of breath.  No history of heart disease.    Past Medical History:   has a past medical history of Asthma.    Past Surgical History:   has no past surgical history on file.     Home Medications:    Prior to Admission medications    Medication Sig Start Date End Date Taking? Authorizing Provider   albuterol (ACCUNEB) 0.63 MG/3ML nebulizer solution Take 3 mLs by nebulization every 6 hours as needed for Wheezing   Yes Provider, MD Vish   budesonide-formoterol (SYMBICORT) 160-4.5 MCG/ACT AERO Inhale 2 puffs into the lungs daily 4/25/24  Yes Provider, MD Vish   montelukast (SINGULAIR) 10 MG tablet Take 1 tablet by mouth nightly 4/25/24  Yes Provider, MD Vish   benzonatate (TESSALON PERLES) 100 MG capsule Take 1 capsule by mouth 3 times daily as needed for Cough  Patient not taking: Reported on 8/8/2024 1/26/19   Tanya Rodriguez DO       Allergies:  Pcn [penicillins] and Penicillin v    Social History:   reports that he has never smoked. He has never     136   K 4.1 3.8   CO2 25 19*   BUN 15 13   CREATININE 1.1 1.1   LABGLOM 90 90   GLUCOSE 118* 121*     BNP: No results for input(s): \"BNP\" in the last 72 hours.  PT/INR: No results for input(s): \"PROTIME\", \"INR\" in the last 72 hours.  APTT:No results for input(s): \"APTT\" in the last 72 hours.  CARDIAC ENZYMES:No results for input(s): \"CKTOTAL\", \"CKMB\", \"CKMBINDEX\", \"TROPONINI\" in the last 72 hours.    ASSESSMENT:  Troponin elevation likely type II from supply/demand mismatch in the setting of infection  Vasovagal/orthostatic syncope while taking hot shower and after vomiting  Febrile illness      RECOMMENDATIONS:  No ACS.  Check 2D echocardiogram to rule out any component of myocarditis especially in the setting of febrile illness  Will follow      Tim Swann MD  Fellow, cardiovascular diseases  [facility]   8/11/2024, 10:16 AM    Attending Physician Statement  I have discussed the case of Jose Ruby including pertinent history and exam findings with the student/resident/fellow. I have seen and examined the patient and the key elements of the encounter have been performed by me. I agree with the assessment, plan and orders as documented by the resident With changes made to the note.     Electronically signed by Robson Quintero MD on 8/11/2024 at 2:04 PM.    [facility]      [phone]

## 2024-08-11 NOTE — PROGRESS NOTES
[facility] -   Infectious diseases evaluation  admission date 8/10/2024    reason for consultation:   Viral illness in [place]    Impression :   Current:  Febrile illness with headache, papular rash, muscle aches, elevated troponin, lymphopenia, relative neutropenia, thrombocytopenia, elevated liver enzymes  Recent trip to [place] hunting and screening in [place]-  Exposed to ticks and to mosquito, to rodents  Right fifth toe bite, improving     Other:  Rash to penicillin as a young child  Discussion / summary of stay / plan of care/ Recommendations:   Differential, chikungunya, Leptospira, Ehrlichia, Rickettsia, hantavirus, dengue, malaria, typhoid  HENCE:   Pending dengue titers from 8/8  Sending malaria smear 8/10 and 8/11  Pending malaria smear 8/8  Send titers for ehrlichia, leptospira blood and urine, hepatitis panel, typhoid, chikungunya,   2 blood cultures for 24 to be repeated  Respiratory PCR panel and acute hepatitis panel are neg  Improved CRP, liver function test,  troponin  and plts and ALC    Antimicrobials:  Doxycycline 100 twice a day started 8/10  Ceftriaxone 2 g a day started 8/10  Long discussion with family  Case discussed with Dr. See    If better in am, anticipate DC w doxy po 2 weeks and ceftriaxone  iv 1 weeks , till more info is out   Reconsiled   Infection Control Recommendations   [place] Precautions  Contact Isolation       Antimicrobial Stewardship Recommendations   Simplification of therapy  Targeted therapy      History of Present Illness:   Initial history:  Jose Ruby is a 34 y.o.-year-old male who went to [place] for 8 days hunting swimming and [place]a, went into bed everywhere, exposed to ticks to mosquito but also to rodents    He went with a relative, both of them were sick within the week of coming back  Patient came back  8/1, started noticing a few days later aches all over possibly a low-grade fever  but then  on 8/8 a fever that was high  Went to a [facility] urgent care they sent blood for a thick smear looking for malaria as well as a dengue virus, they tested him negative for sore except throat they also tested negative for a rapid COVID.    On 8/10 the patient vomited at home he was not feeling good and he fainted.  This is when he came to the emergency room to [place] and was sent to [place]    He is alert appropriate, he has a fine rash that seems to be papular, spreading diffusely over the arms and the back, seems to be more affecting the arms, not affecting the wrist areas or the palms or the soles  No blistering or pustules and on the top of them    He has a headache, does not have a sore throat.  He has no joint pain but she has fatigue and aches all over.    The fever that he is having is not rhythmic seems to be just present  No major chills associated    He has a right fifth toe insect bite that seems to be resolving without any black eschar    His white count is relatively on the low side, with a lymphopenia as well as elevated troponin 102, platelets 174 decreased, elevated liver enzymes that seem to range in the low 100s  His CRP was 55    Infectious consulted for antibiotic management and for diagnosis      Interval changes  8/11/2024   Patient Vitals for the past 8 hrs:   BP Temp Temp src Pulse Resp SpO2   08/11/24 0807 119/79 97.7 °F (36.5 °C) Oral 87 16 100 %     8/11  No fevers   Troponin better , CRP and ALC plts,all better  Hepatitis panel negative   Rest of the Ix pending LFTs improving   Feeling much better   No  concerns         Summary of relevant labs:  Labs: -   CRP 51 < 55 - 51  ALT  100 < 124 < 157 - 100  AST 50 <  - 50  Alkaline phosphatase 93-96  Bilirubin 0.7  Glucose is 118  Procalc 0.39    WBC 6.5-4.7  Neutrophils 72%  Lymphocyte 16-18% -    absolute lymphocyte count 1.8 - 1.99  Platelets 174-158 - 164      UA - unremarkable     Micro:  BC 8/10 - no growth on 1 bottle

## 2024-08-12 ENCOUNTER — APPOINTMENT (OUTPATIENT)
Age: 34
DRG: 864 | End: 2024-08-12
Attending: STUDENT IN AN ORGANIZED HEALTH CARE EDUCATION/TRAINING PROGRAM
Payer: COMMERCIAL

## 2024-08-12 VITALS
OXYGEN SATURATION: 100 % | BODY MASS INDEX: 30.34 KG/M2 | RESPIRATION RATE: 16 BRPM | SYSTOLIC BLOOD PRESSURE: 127 MMHG | HEART RATE: 85 BPM | DIASTOLIC BLOOD PRESSURE: 76 MMHG | TEMPERATURE: 97.9 F | HEIGHT: 72 IN | WEIGHT: 224 LBS

## 2024-08-12 DIAGNOSIS — L03.039 CELLULITIS OF TOE, UNSPECIFIED LATERALITY: Primary | ICD-10-CM

## 2024-08-12 PROBLEM — L03.90 CELLULITIS: Status: ACTIVE | Noted: 2024-08-12

## 2024-08-12 LAB
ALBUMIN SERPL-MCNC: 3.6 G/DL (ref 3.5–5.2)
ALBUMIN/GLOB SERPL: 1 {RATIO} (ref 1–2.5)
ALP SERPL-CCNC: 83 U/L (ref 40–129)
ALT SERPL-CCNC: 85 U/L (ref 10–50)
ANION GAP SERPL CALCULATED.3IONS-SCNC: 10 MMOL/L (ref 9–16)
AST SERPL-CCNC: 33 U/L (ref 10–50)
BASOPHILS # BLD: 0.04 K/UL (ref 0–0.2)
BASOPHILS NFR BLD: 1 % (ref 0–2)
BILIRUB SERPL-MCNC: 0.3 MG/DL (ref 0–1.2)
BUN SERPL-MCNC: 9 MG/DL (ref 6–20)
CALCIUM SERPL-MCNC: 8.2 MG/DL (ref 8.6–10.4)
CHLORIDE SERPL-SCNC: 108 MMOL/L (ref 98–107)
CO2 SERPL-SCNC: 22 MMOL/L (ref 20–31)
CREAT SERPL-MCNC: 0.9 MG/DL (ref 0.7–1.2)
CRP SERPL HS-MCNC: 41.9 MG/L (ref 0–5)
DENGUE VIRUS IGG: 0.65 IV
DENGUE VIRUS IGM: 1.12 IV
ECHO AO ASC DIAM: 3 CM
ECHO AO ASCENDING AORTA INDEX: 1.34 CM/M2
ECHO AO ROOT DIAM: 3 CM
ECHO AO ROOT INDEX: 1.34 CM/M2
ECHO AV AREA PEAK VELOCITY: 3.5 CM2
ECHO AV AREA VTI: 3.1 CM2
ECHO AV AREA/BSA PEAK VELOCITY: 1.6 CM2/M2
ECHO AV AREA/BSA VTI: 1.4 CM2/M2
ECHO AV MEAN GRADIENT: 4 MMHG
ECHO AV MEAN VELOCITY: 1 M/S
ECHO AV PEAK GRADIENT: 7 MMHG
ECHO AV PEAK VELOCITY: 1.3 M/S
ECHO AV VELOCITY RATIO: 0.92
ECHO AV VTI: 28.1 CM
ECHO BSA: 2.27 M2
ECHO IVC PROX: 2 CM
ECHO LA AREA 2C: 16.1 CM2
ECHO LA AREA 4C: 18.2 CM2
ECHO LA DIAMETER INDEX: 1.92 CM/M2
ECHO LA DIAMETER: 4.3 CM
ECHO LA MAJOR AXIS: 5.3 CM
ECHO LA MINOR AXIS: 4.9 CM
ECHO LA TO AORTIC ROOT RATIO: 1.43
ECHO LA VOL BP: 49 ML (ref 18–58)
ECHO LA VOL MOD A2C: 44 ML (ref 18–58)
ECHO LA VOL MOD A4C: 50 ML (ref 18–58)
ECHO LA VOL/BSA BIPLANE: 22 ML/M2 (ref 16–34)
ECHO LA VOLUME INDEX MOD A2C: 20 ML/M2 (ref 16–34)
ECHO LA VOLUME INDEX MOD A4C: 22 ML/M2 (ref 16–34)
ECHO LV E' LATERAL VELOCITY: 13 CM/S
ECHO LV E' SEPTAL VELOCITY: 13 CM/S
ECHO LV EDV A2C: 79 ML
ECHO LV EDV A4C: 142 ML
ECHO LV EDV INDEX A4C: 63 ML/M2
ECHO LV EDV NDEX A2C: 35 ML/M2
ECHO LV EJECTION FRACTION A2C: 47 %
ECHO LV EJECTION FRACTION A4C: 65 %
ECHO LV EJECTION FRACTION BIPLANE: 60 % (ref 55–100)
ECHO LV ESV A2C: 42 ML
ECHO LV ESV A4C: 49 ML
ECHO LV ESV INDEX A2C: 19 ML/M2
ECHO LV ESV INDEX A4C: 22 ML/M2
ECHO LV FRACTIONAL SHORTENING: 28 % (ref 28–44)
ECHO LV INTERNAL DIMENSION DIASTOLE INDEX: 2.37 CM/M2
ECHO LV INTERNAL DIMENSION DIASTOLIC: 5.3 CM (ref 4.2–5.9)
ECHO LV INTERNAL DIMENSION SYSTOLIC INDEX: 1.7 CM/M2
ECHO LV INTERNAL DIMENSION SYSTOLIC: 3.8 CM
ECHO LV IVSD: 1 CM (ref 0.6–1)
ECHO LV MASS 2D: 187.3 G (ref 88–224)
ECHO LV MASS INDEX 2D: 83.6 G/M2 (ref 49–115)
ECHO LV POSTERIOR WALL DIASTOLIC: 0.9 CM (ref 0.6–1)
ECHO LV RELATIVE WALL THICKNESS RATIO: 0.34
ECHO LVOT AREA: 3.8 CM2
ECHO LVOT AV VTI INDEX: 0.81
ECHO LVOT DIAM: 2.2 CM
ECHO LVOT MEAN GRADIENT: 3 MMHG
ECHO LVOT PEAK GRADIENT: 6 MMHG
ECHO LVOT PEAK VELOCITY: 1.2 M/S
ECHO LVOT STROKE VOLUME INDEX: 38.5 ML/M2
ECHO LVOT SV: 86.2 ML
ECHO LVOT VTI: 22.7 CM
ECHO MV A VELOCITY: 0.43 M/S
ECHO MV AREA VTI: 3.8 CM2
ECHO MV E DECELERATION TIME (DT): 194 MS
ECHO MV E VELOCITY: 0.92 M/S
ECHO MV E/A RATIO: 2.14
ECHO MV E/E' LATERAL: 7.08
ECHO MV E/E' RATIO (AVERAGED): 7.08
ECHO MV E/E' SEPTAL: 7.08
ECHO MV LVOT VTI INDEX: 1
ECHO MV MAX VELOCITY: 0.8 M/S
ECHO MV MEAN GRADIENT: 2 MMHG
ECHO MV MEAN VELOCITY: 0.6 M/S
ECHO MV PEAK GRADIENT: 3 MMHG
ECHO MV VTI: 22.6 CM
ECHO RV FREE WALL PEAK S': 12 CM/S
ECHO RV TAPSE: 2.6 CM (ref 1.7–?)
EOSINOPHIL # BLD: 0.1 K/UL (ref 0–0.44)
EOSINOPHILS RELATIVE PERCENT: 1 % (ref 1–4)
ERYTHROCYTE [DISTWIDTH] IN BLOOD BY AUTOMATED COUNT: 13 % (ref 11.8–14.4)
GFR, ESTIMATED: >90 ML/MIN/1.73M2
GLUCOSE SERPL-MCNC: 94 MG/DL (ref 74–99)
HCT VFR BLD AUTO: 39.6 % (ref 40.7–50.3)
HGB BLD-MCNC: 13.4 G/DL (ref 13–17)
IMM GRANULOCYTES # BLD AUTO: <0.03 K/UL (ref 0–0.3)
IMM GRANULOCYTES NFR BLD: 0 %
LYMPHOCYTES NFR BLD: 2.87 K/UL (ref 1.1–3.7)
LYMPHOCYTES RELATIVE PERCENT: 37 % (ref 24–43)
MALARIA SMEAR BLD: NORMAL
MALARIA SMEAR BLD: NORMAL
MCH RBC QN AUTO: 29.5 PG (ref 25.2–33.5)
MCHC RBC AUTO-ENTMCNC: 33.8 G/DL (ref 28.4–34.8)
MCV RBC AUTO: 87 FL (ref 82.6–102.9)
MICROORGANISM SPEC CULT: ABNORMAL
MONOCYTES NFR BLD: 0.83 K/UL (ref 0.1–1.2)
MONOCYTES NFR BLD: 11 % (ref 3–12)
NEUTROPHILS NFR BLD: 50 % (ref 36–65)
NEUTS SEG NFR BLD: 3.83 K/UL (ref 1.5–8.1)
NRBC BLD-RTO: 0 PER 100 WBC
PATHOLOGIST: NORMAL
PATHOLOGIST: NORMAL
PLATELET # BLD AUTO: 178 K/UL (ref 138–453)
PMV BLD AUTO: 9.7 FL (ref 8.1–13.5)
POTASSIUM SERPL-SCNC: 4.1 MMOL/L (ref 3.7–5.3)
PROT SERPL-MCNC: 6.3 G/DL (ref 6.6–8.7)
RBC # BLD AUTO: 4.55 M/UL (ref 4.21–5.77)
SEND OUT REPORT: NORMAL
SERVICE CMNT-IMP: ABNORMAL
SODIUM SERPL-SCNC: 140 MMOL/L (ref 136–145)
SPECIMEN DESCRIPTION: ABNORMAL
TEST NAME: NORMAL
TROPONIN I SERPL HS-MCNC: 46 NG/L (ref 0–22)
WBC OTHER # BLD: 7.7 K/UL (ref 3.5–11.3)

## 2024-08-12 PROCEDURE — 6370000000 HC RX 637 (ALT 250 FOR IP): Performed by: NURSE PRACTITIONER

## 2024-08-12 PROCEDURE — 84484 ASSAY OF TROPONIN QUANT: CPT

## 2024-08-12 PROCEDURE — 2500000003 HC RX 250 WO HCPCS: Performed by: PHYSICIAN ASSISTANT

## 2024-08-12 PROCEDURE — G0378 HOSPITAL OBSERVATION PER HR: HCPCS

## 2024-08-12 PROCEDURE — 2580000003 HC RX 258: Performed by: PHYSICIAN ASSISTANT

## 2024-08-12 PROCEDURE — 99238 HOSP IP/OBS DSCHRG MGMT 30/<: CPT | Performed by: STUDENT IN AN ORGANIZED HEALTH CARE EDUCATION/TRAINING PROGRAM

## 2024-08-12 PROCEDURE — 93306 TTE W/DOPPLER COMPLETE: CPT | Performed by: INTERNAL MEDICINE

## 2024-08-12 PROCEDURE — 85025 COMPLETE CBC W/AUTO DIFF WBC: CPT

## 2024-08-12 PROCEDURE — 93306 TTE W/DOPPLER COMPLETE: CPT

## 2024-08-12 PROCEDURE — 36415 COLL VENOUS BLD VENIPUNCTURE: CPT

## 2024-08-12 PROCEDURE — 94761 N-INVAS EAR/PLS OXIMETRY MLT: CPT

## 2024-08-12 PROCEDURE — 86140 C-REACTIVE PROTEIN: CPT

## 2024-08-12 PROCEDURE — 96366 THER/PROPH/DIAG IV INF ADDON: CPT

## 2024-08-12 PROCEDURE — 99232 SBSQ HOSP IP/OBS MODERATE 35: CPT | Performed by: INTERNAL MEDICINE

## 2024-08-12 PROCEDURE — 80053 COMPREHEN METABOLIC PANEL: CPT

## 2024-08-12 RX ORDER — HEPARIN 100 UNIT/ML
100 SYRINGE INTRAVENOUS PRN
Status: CANCELLED | OUTPATIENT
Start: 2024-08-13

## 2024-08-12 RX ORDER — SODIUM CHLORIDE 9 MG/ML
INJECTION, SOLUTION INTRAVENOUS PRN
Status: DISCONTINUED | OUTPATIENT
Start: 2024-08-12 | End: 2024-08-12 | Stop reason: HOSPADM

## 2024-08-12 RX ORDER — SODIUM CHLORIDE 0.9 % (FLUSH) 0.9 %
5-40 SYRINGE (ML) INJECTION PRN
Status: CANCELLED | OUTPATIENT
Start: 2024-08-13

## 2024-08-12 RX ORDER — SODIUM CHLORIDE 9 MG/ML
5-250 INJECTION, SOLUTION INTRAVENOUS PRN
Status: CANCELLED | OUTPATIENT
Start: 2024-08-13

## 2024-08-12 RX ORDER — SODIUM CHLORIDE 0.9 % (FLUSH) 0.9 %
5-40 SYRINGE (ML) INJECTION EVERY 12 HOURS SCHEDULED
Status: DISCONTINUED | OUTPATIENT
Start: 2024-08-12 | End: 2024-08-12 | Stop reason: HOSPADM

## 2024-08-12 RX ORDER — SODIUM CHLORIDE 0.9 % (FLUSH) 0.9 %
5-40 SYRINGE (ML) INJECTION PRN
Status: DISCONTINUED | OUTPATIENT
Start: 2024-08-12 | End: 2024-08-12 | Stop reason: HOSPADM

## 2024-08-12 RX ADMIN — DOXYCYCLINE 100 MG: 100 INJECTION, POWDER, LYOPHILIZED, FOR SOLUTION INTRAVENOUS at 10:44

## 2024-08-12 RX ADMIN — IBUPROFEN 400 MG: 400 TABLET, FILM COATED ORAL at 08:27

## 2024-08-12 ASSESSMENT — PAIN DESCRIPTION - DESCRIPTORS: DESCRIPTORS: ACHING

## 2024-08-12 ASSESSMENT — ENCOUNTER SYMPTOMS
APNEA: 0
EYES NEGATIVE: 1
BACK PAIN: 0
PHOTOPHOBIA: 0
EYE DISCHARGE: 0
EYE PAIN: 0
RESPIRATORY NEGATIVE: 1
GASTROINTESTINAL NEGATIVE: 1
COUGH: 0
ABDOMINAL DISTENTION: 0
EYE ITCHING: 0
ABDOMINAL PAIN: 0

## 2024-08-12 ASSESSMENT — PAIN SCALES - GENERAL
PAINLEVEL_OUTOF10: 3
PAINLEVEL_OUTOF10: 2

## 2024-08-12 ASSESSMENT — PAIN DESCRIPTION - LOCATION: LOCATION: NECK

## 2024-08-12 NOTE — PROGRESS NOTES
Writer spoke to the patient that he need to wait for 2D echo results before discharge however patient continued to report that he is leaving before 2D echo result, writer explained to the patient the risk of leaving the hospital before  echo result, writer advised the patient that if he decided to leave he need to follow-up with cardiology in his office to discuss 2D echo results finding and for further evaluation  if needed, discussed with RN

## 2024-08-12 NOTE — DISCHARGE INSTR - COC
Continuity of Care Form    Patient Name: Jose Ruby   :  1990  MRN:  [id number]    Admit date:  8/10/2024  Discharge date:  ***    Code Status Order: Full Code   Advance Directives:   Advance Care Flowsheet Documentation             Admitting Physician:  María Guevara MD  PCP: No primary care provider on file.    Discharging Nurse: ***  Discharging Hospital Unit/Room#: 0234/0234-01  Discharging Unit Phone Number: ***    Emergency Contact:   Extended Emergency Contact Information  Primary Emergency Contact: Meera Ruby  Mobile Phone: [phone]  Relation: Spouse   needed? No  Secondary Emergency Contact: Richie Ruby   [facility]  Home Phone: [phone]  Mobile Phone: [phone]  Relation: Parent    Past Surgical History:  No past surgical history on file.    Immunization History:   Immunization History   Administered Date(s) Administered    COVID-19, PFIZER PURPLE top, DILUTE for use, (age 12 y+), 30mcg/0.3mL 2021, 2021       Active Problems:  Patient Active Problem List   Diagnosis Code    Ingrowing nail Hallux  ft L60.0    Fever of unknown origin R50.9    Elevated troponin R79.89    Elevated liver enzymes R74.8       Isolation/Infection:   Isolation            No Isolation          Patient Infection Status       None to display                     Nurse Assessment:  Last Vital Signs: /76   Pulse 85   Temp 97.9 °F (36.6 °C) (Oral)   Resp 16   Ht 1.829 m (6')   Wt 101.6 kg (224 lb)   SpO2 100%   BMI 30.38 kg/m²     Last documented pain score (0-10 scale): Pain Level: 2  Last Weight:   Wt Readings from Last 1 Encounters:   08/10/24 101.6 kg (224 lb)     Mental Status:  {IP PT MENTAL STATUS:}    IV Access:  { [facility] IV ACCESS:768979643}    Nursing Mobility/ADLs:  Walking   {CHP DME ADLs:911284751}  Transfer  {CHP DME ADLs:747011054}  Bathing  {CHP DME ADLs:607662032}  Dressing  {CHP DME ADLs:282452504}  Toileting  {CHP DME  ADLs:513264058}  Feeding  {CHP DME ADLs:771798315}  Med Admin  {P DME ADLs:726337200}  Med Delivery   { [facility] MED Delivery:930936409}    Wound Care Documentation and Therapy:        Elimination:  Continence:   Bowel: {YES / NO:}  Bladder: {YES / NO:}  Urinary Catheter: {Urinary Catheter:920549745}   Colostomy/Ileostomy/Ileal Conduit: {YES / NO:}       Date of Last BM: ***  No intake or output data in the 24 hours ending 24 1432  I/O last 3 completed shifts:  In: 840 [P.O.:840]  Out: 1300 [Urine:1300]    Safety Concerns:     { [facility] Safety Concerns:133156129}    Impairments/Disabilities:      { [facility] Impairments/Disabilities:780094588}    Nutrition Therapy:  Current Nutrition Therapy:   { [facility] Diet List:690894622}    Routes of Feeding: {[facility] DME Other Feedings:846206764}  Liquids: {Slp liquid thickness:43436}  Daily Fluid Restriction: {P DME Yes amt example:636509171}  Last Modified Barium Swallow with Video (Video Swallowing Test): {Done Not Done Date:}    Treatments at the Time of Hospital Discharge:   Respiratory Treatments: ***  Oxygen Therapy:  {Therapy; copd oxygen:44661}  Ventilator:    { CC Vent List:126877797}    Rehab Therapies: {THERAPEUTIC INTERVENTION:2878259392}  Weight Bearing Status/Restrictions: {[facility] Weight Bearin}  Other Medical Equipment (for information only, NOT a DME order):  {EQUIPMENT:787196698}  Other Treatments: ***    Patient's personal belongings (please select all that are sent with patient):  {[facility] DME Belongings:887403885}    RN SIGNATURE:  {Esignature:250191505}    CASE MANAGEMENT/SOCIAL WORK SECTION    Inpatient Status Date: ***    Readmission Risk Assessment Score:  Readmission Risk              Risk of Unplanned Readmission:  7           Discharging to Facility/ Agency   Name:   Address:  Phone:  Fax:    Dialysis Facility (if applicable)   Name:  Address:  Dialysis Schedule:  Phone:  Fax:    / signature:

## 2024-08-12 NOTE — PROGRESS NOTES
Patient eager to go home even echo not yet read. Primary explained the risk of leaving the hospital before echo result but still want to go home. No need for AMA paper as per primary.    Notified cardio service NP Mariely regarding the situation. Acknowledged by cardiology.    Discharge patient ambulatory with all his belongings. Home medications and home instructions explained to the patient. All questions answered, verbalized understanding.

## 2024-08-12 NOTE — PROGRESS NOTES
[facility]  Office: [phone]  Micky Hoover DO, Neil Siddiqui DO, Francisco Hunter DO, Louie Rodriguez DO, Julita Calderon MD, Jennifer Chicas MD, Omar Banda MD, Lisette Zhao MD,  Lan Hanna MD, Morgan Mohamud MD, Lucia Horan MD,  Veronica Orlando DO, Benson Sharma MD, Juan R Gray MD, Yehuda Hoover DO, Angela Yun MD,  Reed Quiroz DO, Bre Pichardo MD, Sara Floyd MD, Gladys Gabriel MD, Hermilo Talley MD,  Rene Torres MD, Agnes Francisco MD, Amelia Kelly MD, María Guevara MD, Alfa Stephens MD, Davon Parikh MD, Per Jones DO, Cody Rubio DO, Garo Braswell MD,  Dusty Aguiar MD, Shirley Waterhouse, CNP,  Shona Mckeon CNP, Angus Anton, CNP,  Klaudia Cartagena, DNP, Hilary Barrientos, CNP, Tanya Negrete, CNP, Patricia Mckeon CNP, Krystin Orantes, CNP, Katuiska Pelayo, PA-C, Marlene Whyte PA-C, Francisca Tijerina, CNP, Annette Neal, CNP, Julia Mcgraw, CNP, Rosy Carrillo, CNP, Rosana Monzon, CNS, Deepti Wren, CNP, Madhavi Chávez CNP, Tracy Schwab, CNP         [facility]   IN-PATIENT SERVICE   [facility]    Progress Note    8/12/2024    1:26 PM    Name:   Jsoe Ruby  MRN:     [id number]     Acct:      [id number]   Room:   0234/0234-01   Day:  2  Admit Date:  8/10/2024  6:12 PM    PCP:   No primary care provider on file.  Code Status:  Full Code    Subjective:     C/C: No chief complaint on file.    Interval History Status: improved.   Patient was seen and examined at bedside today, at time of my evaluation he denies any complaint denies fever chills, he remained medically stable, cleared from infectious disease team to be discharged and outpatient follow-up, cardiology is following pending 2D echo  Discussed with the patient and his family at bedside that if cardiology okay and if his 2D echo with low risk he can be discharged today and outpatient follow-up, discussed with RN  History of Present Illness:      Jose DEAL  100* 85*   ALKPHOS 93  --  87 83   BILITOT 0.8  --  0.4 0.3   BILIDIR 0.2  --   --   --    LIPASE 48  --   --   --    CHOL  --  136  --   --    HDL  --  15*  --   --    CHOLHDLRATIO  --  9.0  --   --    TRIG  --  683*  --   --    VLDL  --  Can not be calculated  --   --      ABG:No results found for: \"POCPH\", \"PHART\", \"PH\", \"POCPCO2\", \"ZJL8BQH\", \"PCO2\", \"POCPO2\", \"PO2ART\", \"PO2\", \"POCHCO3\", \"BSU5TVI\", \"HCO3\", \"NBEA\", \"PBEA\", \"BEART\", \"BE\", \"THGBART\", \"THB\", \"APH5XPH\", \"FKEW9IGO\", \"X9UMVRBB\", \"O2SAT\", \"FIO2\"  Lab Results   Component Value Date/Time    SPECIAL R AC 6ML 08/10/2024 09:05 PM     Lab Results   Component Value Date/Time    CULTURE NO GROWTH 1 DAY 08/10/2024 09:05 PM       Radiology:  CT CHEST PULMONARY EMBOLISM W CONTRAST    Result Date: 8/10/2024  No evidence of pulmonary embolism or acute pulmonary abnormality.       Physical Examination:        Physical Exam  Constitutional:       General: He is not in acute distress.     Appearance: Normal appearance.   HENT:      Head: Normocephalic and atraumatic.      Mouth/Throat:      Mouth: Mucous membranes are moist.   Eyes:      Extraocular Movements: Extraocular movements intact.      Pupils: Pupils are equal, round, and reactive to light.   Cardiovascular:      Rate and Rhythm: Normal rate and regular rhythm.      Heart sounds: No murmur heard.  Pulmonary:      Effort: No respiratory distress.      Breath sounds: No wheezing or rales.   Abdominal:      General: There is no distension.      Tenderness: There is no abdominal tenderness. There is no rebound.   Musculoskeletal:         General: No deformity.      Cervical back: No rigidity or tenderness.      Right lower leg: No edema.      Left lower leg: No edema.   Lymphadenopathy:      Cervical: No cervical adenopathy.   Skin:     Coloration: Skin is not jaundiced or pale.      Findings: No lesion or rash.   Neurological:      General: No focal deficit present.      Mental Status: He is alert and oriented to

## 2024-08-12 NOTE — CARE COORDINATION
08/12/24 1143   Service Assessment   Patient Orientation Alert and Oriented   Cognition Alert   History Provided By Patient   Primary Caregiver Self   Support Systems Spouse/Significant Other;Family Members   PCP Verified by CM Yes   Last Visit to PCP Within last 3 months   Prior Functional Level Independent in ADLs/IADLs   Current Functional Level Independent in ADLs/IADLs   Can patient return to prior living arrangement Yes   Ability to make needs known: Good   Family able to assist with home care needs: Yes   Would you like for me to discuss the discharge plan with any other family members/significant others, and if so, who? Yes  (spouse)   Social/Functional History   Lives With Spouse   Type of Home House   Home Equipment None   ADL Assistance Independent   Homemaking Assistance Independent   Ambulation Assistance Independent   Transfer Assistance Independent   Active  Yes   Mode of Transportation Car   Occupation Full time employment   Discharge Planning   Current Services Prior To Admission None     Case Management Assessment  Initial Evaluation    Date/Time of Evaluation: 8/12/2024 11:44 AM  Assessment Completed by: Ruchi Olivia RN    If patient is discharged prior to next notation, then this note serves as note for discharge by case management.    Patient Name: Jose Ruby                   YOB: 1990  Diagnosis: Fever of unknown origin [R50.9]                   Date / Time: 8/10/2024  6:12 PM    Patient Admission Status: Inpatient   Readmission Risk (Low < 19, Mod (19-27), High > 27): Readmission Risk Score: 3.3    Current PCP: No primary care provider on file.  PCP verified by CM? (P) Yes    Chart Reviewed: Yes      History Provided by: (P) Patient  Patient Orientation: (P) Alert and Oriented    Patient Cognition: (P) Alert    Hospitalization in the last 30 days (Readmission):  No    If yes, Readmission Assessment in CM Navigator will be completed.    Advance Directives:       Code Status: Full Code   Patient's Primary Decision Maker is:        Discharge Planning:    Patient lives with: Spouse/Significant Other Type of Home: House  Primary Care Giver: (P) Self  Patient Support Systems include: (P) Spouse/Significant Other, Family Members   Current Financial resources:    Current community resources:    Current services prior to admission: (P) None            Current DME:              Type of Home Care services:  None    ADLS  Prior functional level: (P) Independent in ADLs/IADLs  Current functional level: (P) Independent in ADLs/IADLs    PT AM-PAC:   /24  OT AM-PAC:   /24    Family can provide assistance at DC: (P) Yes  Would you like Case Management to discuss the discharge plan with any other family members/significant others, and if so, who? (P) Yes (spouse)  Plans to Return to Present Housing: (P) Yes  Other Identified Issues/Barriers to RETURNING to current housing: none  Potential Assistance needed at discharge: N/A            Potential DME:    Patient expects to discharge to: House  Plan for transportation at discharge:      Financial    Payor: [employer] / Plan: [employer] [address] / Product Type: *No Product type* /     Does insurance require precert for SNF: Yes    Potential assistance Purchasing Medications:    Meds-to-Beds request: Yes      [id number] - [place], [place] - [address] - P [phone] - F [phone]  [address]  [place] [address]  Phone: [phone] Fax: [phone]    [id number] - [place], [place] - [address] - P [phone] - F [phone]  [address]   [place] [address]  Phone: [phone] Fax: [phone]      Notes:    Factors facilitating achievement of predicted outcomes: Motivated, Cooperative, Pleasant, and Good insight into deficits    Barriers to discharge: none    Additional Case Management Notes: lives with spouse works FT independent set up with [facility] [facility] for iv Abx starting

## 2024-08-13 ENCOUNTER — HOSPITAL ENCOUNTER (OUTPATIENT)
Dept: INFUSION THERAPY | Age: 34
Setting detail: INFUSION SERIES
Discharge: HOME | End: 2024-08-13
Payer: COMMERCIAL

## 2024-08-13 VITALS
DIASTOLIC BLOOD PRESSURE: 87 MMHG | HEART RATE: 79 BPM | RESPIRATION RATE: 14 BRPM | SYSTOLIC BLOOD PRESSURE: 128 MMHG | OXYGEN SATURATION: 99 % | TEMPERATURE: 98.7 F

## 2024-08-13 DIAGNOSIS — L03.039 CELLULITIS OF TOE, UNSPECIFIED LATERALITY: Primary | ICD-10-CM

## 2024-08-13 PROCEDURE — 6360000002 HC RX W HCPCS: Performed by: INTERNAL MEDICINE

## 2024-08-13 PROCEDURE — 2580000003 HC RX 258: Performed by: INTERNAL MEDICINE

## 2024-08-13 PROCEDURE — 96374 THER/PROPH/DIAG INJ IV PUSH: CPT

## 2024-08-13 RX ORDER — HEPARIN 100 UNIT/ML
100 SYRINGE INTRAVENOUS PRN
Status: CANCELLED | OUTPATIENT
Start: 2024-08-14

## 2024-08-13 RX ORDER — SODIUM CHLORIDE 9 MG/ML
5-250 INJECTION, SOLUTION INTRAVENOUS PRN
Status: CANCELLED | OUTPATIENT
Start: 2024-08-14

## 2024-08-13 RX ORDER — SODIUM CHLORIDE 0.9 % (FLUSH) 0.9 %
5-40 SYRINGE (ML) INJECTION PRN
Status: CANCELLED | OUTPATIENT
Start: 2024-08-14

## 2024-08-13 RX ORDER — SODIUM CHLORIDE 9 MG/ML
5-250 INJECTION, SOLUTION INTRAVENOUS PRN
Status: DISCONTINUED | OUTPATIENT
Start: 2024-08-13 | End: 2024-08-14 | Stop reason: HOSPADM

## 2024-08-13 RX ADMIN — SODIUM CHLORIDE 2000 MG: 900 INJECTION INTRAVENOUS at 16:23

## 2024-08-13 NOTE — PROGRESS NOTES
[facility] -   Infectious diseases evaluation  admission date 8/10/2024    reason for consultation:   Viral illness in [place]    Impression :   Current:  Febrile illness with headache, papular rash, muscle aches, elevated troponin, lymphopenia, relative neutropenia, thrombocytopenia, elevated liver enzymes  Recent trip to [place] hunting and screening in [place]-  Exposed to ticks and to mosquito, to rodents  Right fifth toe bite, improving     Other:  Rash to penicillin as a young child  Discussion / summary of stay / plan of care/ Recommendations:   Differential, chikungunya, Leptospira, Ehrlichia, Rickettsia, hantavirus, dengue, malaria, typhoid  HENCE:   Pending dengue titers from 8/8  Sending malaria smear 8/10 and 8/11  Pending malaria smear 8/8  Send titers for ehrlichia, leptospira blood and urine, hepatitis panel, typhoid, chikungunya,   2 blood cultures for 24 to be repeated  Respiratory PCR panel and acute hepatitis panel are neg  Improved CRP, liver function test,  troponin  and plts and ALC    Antimicrobials:  Doxycycline 100 twice a day started 8/10  Ceftriaxone 2 g a day started 8/10  Long discussion with family  Case discussed with Dr. Mayi fajardo po 2 weeks and ceftriaxone  iv 1 weeks , till more info is out   Reconsiled   Infection Control Recommendations   [place] Precautions  Contact Isolation       Antimicrobial Stewardship Recommendations   Simplification of therapy  Targeted therapy      History of Present Illness:   Initial history:  Jose Ruby is a 34 y.o.-year-old male who went to [place] for 8 days hunting swimming and [place], went into bed everywhere, exposed to ticks to mosquito but also to rodents    He went with a relative, both of them were sick within the week of coming back  Patient came back  8/1, started noticing a few days later aches all over possibly a low-grade fever but then  on 8/8 a fever that  ocular suffusion or conjunctival injection  Cardiovascular:      Rate and Rhythm: Normal rate and regular rhythm.      Heart sounds: No murmur heard.     No friction rub.   Pulmonary:      Effort: No respiratory distress.      Breath sounds: No stridor. No wheezing or rhonchi.   Chest:      Chest wall: No tenderness.   Abdominal:      General: There is no distension.      Tenderness: There is no abdominal tenderness. There is no guarding.   Musculoskeletal:         General: No swelling or deformity.      Cervical back: Neck supple. No rigidity or tenderness.   Skin:     Coloration: Skin is not jaundiced.      Findings: Erythema present. No bruising.      Comments: Diffuse papular rash mostly over the arms   Neurological:      Mental Status: He is alert and oriented to person, place, and time.      Cranial Nerves: No cranial nerve deficit.   Psychiatric:         Mood and Affect: Mood normal.         Thought Content: Thought content normal.         Past Medical History:     Past Medical History:   Diagnosis Date    Asthma        Past Surgical  History:   No past surgical history on file.    Medications:           Social History:     Social History     Socioeconomic History    Marital status: Single     Spouse name: Not on file    Number of children: Not on file    Years of education: Not on file    Highest education level: Not on file   Occupational History    Not on file   Tobacco Use    Smoking status: Never    Smokeless tobacco: Never   Substance and Sexual Activity    Alcohol use: No     Alcohol/week: 0.0 standard drinks of alcohol    Drug use: No    Sexual activity: Not on file   Other Topics Concern    Not on file   Social History Narrative    Not on file     Social Determinants of Health     Financial Resource Strain: Low Risk  (8/8/2024)    Overall Financial Resource Strain (CARDIA)     Difficulty of Paying Living Expenses: Not hard at all   Food Insecurity: No Food Insecurity (8/8/2024)    Hunger Vital Sign

## 2024-08-13 NOTE — PROGRESS NOTES
Patient noted to have a red/swollen - phlebitis to Left inner forearm. Warm wet washcloth applied and wrapped with chux. - Instructed patient to continue this 4 times a day and have nurse assess it again tomorrow.     Patient is now off work until he is done with treatment - will change the time of antibiotic infusion to 8am.

## 2024-08-14 ENCOUNTER — HOSPITAL ENCOUNTER (OUTPATIENT)
Dept: INFUSION THERAPY | Age: 34
Setting detail: INFUSION SERIES
Discharge: HOME | End: 2024-08-14
Payer: COMMERCIAL

## 2024-08-14 VITALS
HEIGHT: 72 IN | SYSTOLIC BLOOD PRESSURE: 127 MMHG | HEART RATE: 75 BPM | WEIGHT: 224 LBS | RESPIRATION RATE: 16 BRPM | BODY MASS INDEX: 30.34 KG/M2 | DIASTOLIC BLOOD PRESSURE: 76 MMHG | OXYGEN SATURATION: 98 % | TEMPERATURE: 96.8 F

## 2024-08-14 DIAGNOSIS — L03.039 CELLULITIS OF TOE, UNSPECIFIED LATERALITY: Primary | ICD-10-CM

## 2024-08-14 LAB
MISCELLANEOUS LAB TEST RESULT: NORMAL
TEST NAME: NORMAL

## 2024-08-14 PROCEDURE — 96365 THER/PROPH/DIAG IV INF INIT: CPT

## 2024-08-14 PROCEDURE — 2580000003 HC RX 258: Performed by: INTERNAL MEDICINE

## 2024-08-14 PROCEDURE — 6360000002 HC RX W HCPCS: Performed by: INTERNAL MEDICINE

## 2024-08-14 RX ORDER — SODIUM CHLORIDE 9 MG/ML
5-250 INJECTION, SOLUTION INTRAVENOUS PRN
Status: CANCELLED | OUTPATIENT
Start: 2024-08-15

## 2024-08-14 RX ORDER — HEPARIN 100 UNIT/ML
100 SYRINGE INTRAVENOUS PRN
Status: CANCELLED | OUTPATIENT
Start: 2024-08-15

## 2024-08-14 RX ORDER — SODIUM CHLORIDE 0.9 % (FLUSH) 0.9 %
5-40 SYRINGE (ML) INJECTION PRN
Status: CANCELLED | OUTPATIENT
Start: 2024-08-15

## 2024-08-14 RX ADMIN — CEFTRIAXONE SODIUM 2000 MG: 2 INJECTION, POWDER, FOR SOLUTION INTRAMUSCULAR; INTRAVENOUS at 08:29

## 2024-08-15 ENCOUNTER — HOSPITAL ENCOUNTER (OUTPATIENT)
Dept: INFUSION THERAPY | Age: 34
Setting detail: INFUSION SERIES
Discharge: HOME | End: 2024-08-15
Payer: COMMERCIAL

## 2024-08-15 VITALS — OXYGEN SATURATION: 99 % | RESPIRATION RATE: 16 BRPM | HEART RATE: 75 BPM

## 2024-08-15 DIAGNOSIS — L03.039 CELLULITIS OF TOE, UNSPECIFIED LATERALITY: Primary | ICD-10-CM

## 2024-08-15 LAB
MICROORGANISM SPEC CULT: NORMAL
SERVICE CMNT-IMP: NORMAL
SPECIMEN DESCRIPTION: NORMAL

## 2024-08-15 PROCEDURE — 6360000002 HC RX W HCPCS: Performed by: INTERNAL MEDICINE

## 2024-08-15 PROCEDURE — 96365 THER/PROPH/DIAG IV INF INIT: CPT

## 2024-08-15 PROCEDURE — 2580000003 HC RX 258: Performed by: INTERNAL MEDICINE

## 2024-08-15 RX ORDER — SODIUM CHLORIDE 9 MG/ML
5-250 INJECTION, SOLUTION INTRAVENOUS PRN
Status: CANCELLED | OUTPATIENT
Start: 2024-08-16

## 2024-08-15 RX ORDER — SODIUM CHLORIDE 9 MG/ML
5-250 INJECTION, SOLUTION INTRAVENOUS PRN
Status: DISCONTINUED | OUTPATIENT
Start: 2024-08-15 | End: 2024-08-16 | Stop reason: HOSPADM

## 2024-08-15 RX ORDER — HEPARIN 100 UNIT/ML
100 SYRINGE INTRAVENOUS PRN
Status: CANCELLED | OUTPATIENT
Start: 2024-08-16

## 2024-08-15 RX ORDER — SODIUM CHLORIDE 0.9 % (FLUSH) 0.9 %
5-40 SYRINGE (ML) INJECTION PRN
Status: CANCELLED | OUTPATIENT
Start: 2024-08-16

## 2024-08-15 RX ADMIN — CEFTRIAXONE SODIUM 2000 MG: 2 INJECTION, POWDER, FOR SOLUTION INTRAMUSCULAR; INTRAVENOUS at 08:30

## 2024-08-16 ENCOUNTER — HOSPITAL ENCOUNTER (OUTPATIENT)
Dept: INFUSION THERAPY | Age: 34
Setting detail: INFUSION SERIES
Discharge: HOME | End: 2024-08-16
Payer: COMMERCIAL

## 2024-08-16 VITALS
DIASTOLIC BLOOD PRESSURE: 73 MMHG | HEART RATE: 70 BPM | SYSTOLIC BLOOD PRESSURE: 124 MMHG | RESPIRATION RATE: 16 BRPM | OXYGEN SATURATION: 98 % | TEMPERATURE: 96.8 F

## 2024-08-16 DIAGNOSIS — L03.039 CELLULITIS OF TOE, UNSPECIFIED LATERALITY: Primary | ICD-10-CM

## 2024-08-16 LAB
MISCELLANEOUS LAB TEST RESULT: NORMAL
MISCELLANEOUS LAB TEST RESULT: NORMAL
TEST NAME: NORMAL
TEST NAME: NORMAL

## 2024-08-16 PROCEDURE — 6360000002 HC RX W HCPCS: Performed by: INTERNAL MEDICINE

## 2024-08-16 PROCEDURE — 96365 THER/PROPH/DIAG IV INF INIT: CPT

## 2024-08-16 PROCEDURE — 2580000003 HC RX 258: Performed by: INTERNAL MEDICINE

## 2024-08-16 RX ORDER — HEPARIN 100 UNIT/ML
100 SYRINGE INTRAVENOUS PRN
OUTPATIENT
Start: 2024-08-17

## 2024-08-16 RX ORDER — SODIUM CHLORIDE 0.9 % (FLUSH) 0.9 %
5-40 SYRINGE (ML) INJECTION PRN
OUTPATIENT
Start: 2024-08-17

## 2024-08-16 RX ORDER — SODIUM CHLORIDE 9 MG/ML
5-250 INJECTION, SOLUTION INTRAVENOUS PRN
OUTPATIENT
Start: 2024-08-17

## 2024-08-16 RX ADMIN — CEFTRIAXONE SODIUM 2000 MG: 2 INJECTION, POWDER, FOR SOLUTION INTRAMUSCULAR; INTRAVENOUS at 08:33

## 2024-08-16 NOTE — PROGRESS NOTES
8/16/2024        RE: Jose Ruby         [id number]  [place] [id number]          To Whom It May Concern,      Due to medical reasons, Jose Ruby to be off work 8/10/24 - 8/16/24. He   may return to work on 8/17/24        Sincerely,          Shiela Alvares RN

## 2024-08-16 NOTE — DISCHARGE SUMMARY
[facility]  Office: [phone]  Micky Hoover DO, Neil Siddiqui DO, Francisco Hunter DO, Louie Rodriguez DO, Julita Calderon MD, Jennifer Chicas MD, Omar Banda MD, Lisette Zhao MD,  Lan Hanna MD, Morgan Mohamud MD, Lucia Horan MD,  Veronica Orlando DO, Benson Sharma MD, Juan R Gray MD, Yehuda Hoover DO, Angela Yun MD,  Reed Quiroz DO, Bre Pichardo MD, Sara Floyd MD, Gladys Gabriel MD, Hermilo Talley MD,  Rene Torres MD, Agnes Francisco MD, Amelia Kelly MD, María Guevara MD, Alfa Stephens MD, Davon Parikh MD, Per Jones DO, Cody Rubio DO, Garo Braswell MD,  Dusty Aguiar MD, Shirley Waterhouse, CNP,  Shona Mckeon CNP, Angus Anton, CNP,  Klaudia Cartagena, DNP, Hilary Barrientos, CNP, Tanya Negrete, CNP, Patricia Mckeon CNP, Krystin Orantes, CNP, Katiuska Pelayo, PA-C, Marlene Whyte PA-C, Francisca Tijerina, CNP, Annette Neal, CNP, Julia Mcgraw, CNP, Rosy Carrillo, CNP, Rosana Monzon, CNS, Deepti Wren, CNP, Madhavi Chávez CNP, Tracy Schwab, CNP         [facility]   IN-PATIENT SERVICE   [facility]    Discharge Summary     Patient ID: Jose Ruby  :  1990   MRN: [id number]     ACCOUNT:  [id number]   Patient's PCP: No primary care provider on file.  Admit Date: 8/10/2024   Discharge Date: 24  Length of Stay: 2  Code Status:  Prior  Admitting Physician: María Guevara MD  Discharge Physician: María Guevara MD     Active Discharge Diagnoses:     Fever of unknown origin present on admission-resolved  Transaminates present on admission-resolving      Admission Condition:  fair     Discharged Condition: good    Hospital Stay:     Hospital Course:    34 y.o. Non- / non  male who presents with fever and is admitted to the hospital for the management of Fever of unknown origin. Patient has no significant past medical history.        At outlying facility, patient was febrile

## 2024-08-19 LAB
SEND OUT REPORT: NORMAL
TEST NAME: NORMAL

## 2024-08-23 NOTE — PROGRESS NOTES
Physician Progress Note      PATIENT:               ARI MEIER  [facility] #:                  [id number]  :                       1990  ADMIT DATE:       8/10/2024 6:12 PM  DISCH DATE:        2024 4:21 PM  RESPONDING  PROVIDER #:        DICK MORALES MD          QUERY TEXT:    Patient admitted with Febrile illness.  Noted documentation of \"Troponin   elevation likely type II from supply/demand mismatch in the setting of   infection\" in cardiology consult note on . In order to support the   diagnosis of type II MI, please refer to 4th universal definition of MI below   and include additional clinical indicators in your documentation.  Or please   document if the diagnosis of type II MI has been ruled out after study.  ?  The medical record reflects the following:  Risk Factors: Fever of unknown origin  Clinical Indicators:  ECHO; Left Ventricle: Normal left ventricular   systolic function. EF by 2D Simpsons Biplane is 60%. Left ventricle size is   normal. Normal wall thickness. Normal wall motion. Normal diastolic function.    Mitral Valve: Trace regurgitation.  Left Atrium: Left atrium is mild to   moderately dilated.  Trop 66>46, CRP 51.3>41.9, Procalcitonin 0.39  Treatment: Cardiology consult, Echo    Fourth Universal Definition of Myocardial Infarction:  Clearly separates MI   from myocardial injury. Patients with elevated blood troponin levels but   without clinical evidence of ischemia are said to have had a myocardial   injury.? To have a myocardial infarction requires both an elevated troponin   blood test along with at least one of the following:  - Symptoms of acute myocardial ischemia (Types 1 - 5 MI)  - Clinical evidence of ischemia, as evidenced in an electrocardiogram (EKG)   showing new ischemic changes (Type 1, Type 2, Type 3, or Type 4a MI)  - Development of pathological Q waves (Types 1 - 5 MI)  - Imaging evidence of new loss of viable myocardium or new regional wall